# Patient Record
Sex: MALE | Race: WHITE | HISPANIC OR LATINO | Employment: STUDENT | URBAN - METROPOLITAN AREA
[De-identification: names, ages, dates, MRNs, and addresses within clinical notes are randomized per-mention and may not be internally consistent; named-entity substitution may affect disease eponyms.]

---
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
## 2021-01-05 ENCOUNTER — CLINICAL SUPPORT (OUTPATIENT)
Dept: URGENT CARE | Facility: CLINIC | Age: 21
End: 2021-01-05
Payer: MEDICAID

## 2021-01-05 DIAGNOSIS — Z20.822 ENCOUNTER FOR LABORATORY TESTING FOR COVID-19 VIRUS: Primary | ICD-10-CM

## 2021-01-05 LAB
CTP QC/QA: YES
SARS-COV-2 RDRP RESP QL NAA+PROBE: NEGATIVE

## 2021-01-05 PROCEDURE — U0002 COVID-19 LAB TEST NON-CDC: HCPCS | Mod: QW,S$GLB,, | Performed by: FAMILY MEDICINE

## 2021-01-05 PROCEDURE — U0002: ICD-10-PCS | Mod: QW,S$GLB,, | Performed by: FAMILY MEDICINE

## 2021-07-01 ENCOUNTER — PATIENT MESSAGE (OUTPATIENT)
Dept: ADMINISTRATIVE | Facility: OTHER | Age: 21
End: 2021-07-01

## 2021-10-15 ENCOUNTER — OFFICE VISIT (OUTPATIENT)
Dept: URGENT CARE | Facility: CLINIC | Age: 21
End: 2021-10-15
Payer: MEDICAID

## 2021-10-15 VITALS
RESPIRATION RATE: 16 BRPM | TEMPERATURE: 99 F | SYSTOLIC BLOOD PRESSURE: 140 MMHG | WEIGHT: 185 LBS | OXYGEN SATURATION: 98 % | BODY MASS INDEX: 25.06 KG/M2 | DIASTOLIC BLOOD PRESSURE: 91 MMHG | HEIGHT: 72 IN | HEART RATE: 85 BPM

## 2021-10-15 DIAGNOSIS — R03.0 ELEVATED BLOOD PRESSURE READING: ICD-10-CM

## 2021-10-15 DIAGNOSIS — Z11.52 ENCOUNTER FOR SCREENING LABORATORY TESTING FOR COVID-19 VIRUS: Primary | ICD-10-CM

## 2021-10-15 DIAGNOSIS — J06.9 ACUTE URI: ICD-10-CM

## 2021-10-15 LAB
CTP QC/QA: YES
SARS-COV-2 RDRP RESP QL NAA+PROBE: NEGATIVE

## 2021-10-15 PROCEDURE — 99213 PR OFFICE/OUTPT VISIT, EST, LEVL III, 20-29 MIN: ICD-10-PCS | Mod: S$GLB,,, | Performed by: PHYSICIAN ASSISTANT

## 2021-10-15 PROCEDURE — 99213 OFFICE O/P EST LOW 20 MIN: CPT | Mod: S$GLB,,, | Performed by: PHYSICIAN ASSISTANT

## 2021-10-15 PROCEDURE — U0002 COVID-19 LAB TEST NON-CDC: HCPCS | Mod: QW,S$GLB,, | Performed by: PHYSICIAN ASSISTANT

## 2021-10-15 PROCEDURE — U0002: ICD-10-PCS | Mod: QW,S$GLB,, | Performed by: PHYSICIAN ASSISTANT

## 2021-10-18 ENCOUNTER — TELEPHONE (OUTPATIENT)
Dept: URGENT CARE | Facility: CLINIC | Age: 21
End: 2021-10-18

## 2021-11-04 ENCOUNTER — CLINICAL SUPPORT (OUTPATIENT)
Dept: URGENT CARE | Facility: CLINIC | Age: 21
End: 2021-11-04
Payer: MEDICAID

## 2021-11-04 DIAGNOSIS — Z01.812 ENCOUNTER FOR SCREENING LABORATORY TESTING FOR COVID-19 VIRUS IN ASYMPTOMATIC PATIENT: Primary | ICD-10-CM

## 2021-11-04 DIAGNOSIS — Z11.52 ENCOUNTER FOR SCREENING LABORATORY TESTING FOR COVID-19 VIRUS IN ASYMPTOMATIC PATIENT: Primary | ICD-10-CM

## 2021-11-04 LAB
CTP QC/QA: YES
SARS-COV-2 RDRP RESP QL NAA+PROBE: NEGATIVE

## 2021-11-04 PROCEDURE — 99211 PR OFFICE/OUTPT VISIT, EST, LEVL I: ICD-10-PCS | Mod: S$GLB,CS,, | Performed by: NURSE PRACTITIONER

## 2021-11-04 PROCEDURE — U0002 COVID-19 LAB TEST NON-CDC: HCPCS | Mod: QW,S$GLB,, | Performed by: NURSE PRACTITIONER

## 2021-11-04 PROCEDURE — U0002: ICD-10-PCS | Mod: QW,S$GLB,, | Performed by: NURSE PRACTITIONER

## 2021-11-04 PROCEDURE — 99211 OFF/OP EST MAY X REQ PHY/QHP: CPT | Mod: S$GLB,CS,, | Performed by: NURSE PRACTITIONER

## 2021-11-09 ENCOUNTER — CLINICAL SUPPORT (OUTPATIENT)
Dept: URGENT CARE | Facility: CLINIC | Age: 21
End: 2021-11-09
Payer: MEDICAID

## 2021-11-09 DIAGNOSIS — Z11.52 ENCOUNTER FOR SCREENING FOR COVID-19: Primary | ICD-10-CM

## 2021-11-09 LAB
CTP QC/QA: YES
SARS-COV-2 RDRP RESP QL NAA+PROBE: NEGATIVE

## 2021-11-09 PROCEDURE — 99211 PR OFFICE/OUTPT VISIT, EST, LEVL I: ICD-10-PCS | Mod: S$GLB,,, | Performed by: NURSE PRACTITIONER

## 2021-11-09 PROCEDURE — 99211 OFF/OP EST MAY X REQ PHY/QHP: CPT | Mod: S$GLB,,, | Performed by: NURSE PRACTITIONER

## 2021-11-09 PROCEDURE — U0002 COVID-19 LAB TEST NON-CDC: HCPCS | Mod: QW,S$GLB,, | Performed by: NURSE PRACTITIONER

## 2021-11-09 PROCEDURE — U0002: ICD-10-PCS | Mod: QW,S$GLB,, | Performed by: NURSE PRACTITIONER

## 2022-01-19 ENCOUNTER — OFFICE VISIT (OUTPATIENT)
Dept: URGENT CARE | Facility: CLINIC | Age: 22
End: 2022-01-19
Payer: MEDICAID

## 2022-01-19 VITALS
OXYGEN SATURATION: 99 % | WEIGHT: 215 LBS | HEIGHT: 73 IN | HEART RATE: 88 BPM | DIASTOLIC BLOOD PRESSURE: 95 MMHG | RESPIRATION RATE: 18 BRPM | SYSTOLIC BLOOD PRESSURE: 143 MMHG | TEMPERATURE: 97 F | BODY MASS INDEX: 28.49 KG/M2

## 2022-01-19 DIAGNOSIS — J06.9 VIRAL URI: Primary | ICD-10-CM

## 2022-01-19 DIAGNOSIS — R05.9 COUGH: ICD-10-CM

## 2022-01-19 LAB
CTP QC/QA: YES
SARS-COV-2 RDRP RESP QL NAA+PROBE: NEGATIVE

## 2022-01-19 PROCEDURE — 99213 OFFICE O/P EST LOW 20 MIN: CPT | Mod: S$GLB,,, | Performed by: NURSE PRACTITIONER

## 2022-01-19 PROCEDURE — 3008F PR BODY MASS INDEX (BMI) DOCUMENTED: ICD-10-PCS | Mod: CPTII,S$GLB,, | Performed by: NURSE PRACTITIONER

## 2022-01-19 PROCEDURE — 3080F PR MOST RECENT DIASTOLIC BLOOD PRESSURE >= 90 MM HG: ICD-10-PCS | Mod: CPTII,S$GLB,, | Performed by: NURSE PRACTITIONER

## 2022-01-19 PROCEDURE — 1159F PR MEDICATION LIST DOCUMENTED IN MEDICAL RECORD: ICD-10-PCS | Mod: CPTII,S$GLB,, | Performed by: NURSE PRACTITIONER

## 2022-01-19 PROCEDURE — 3080F DIAST BP >= 90 MM HG: CPT | Mod: CPTII,S$GLB,, | Performed by: NURSE PRACTITIONER

## 2022-01-19 PROCEDURE — 99213 PR OFFICE/OUTPT VISIT, EST, LEVL III, 20-29 MIN: ICD-10-PCS | Mod: S$GLB,,, | Performed by: NURSE PRACTITIONER

## 2022-01-19 PROCEDURE — 1159F MED LIST DOCD IN RCRD: CPT | Mod: CPTII,S$GLB,, | Performed by: NURSE PRACTITIONER

## 2022-01-19 PROCEDURE — 3077F SYST BP >= 140 MM HG: CPT | Mod: CPTII,S$GLB,, | Performed by: NURSE PRACTITIONER

## 2022-01-19 PROCEDURE — 3077F PR MOST RECENT SYSTOLIC BLOOD PRESSURE >= 140 MM HG: ICD-10-PCS | Mod: CPTII,S$GLB,, | Performed by: NURSE PRACTITIONER

## 2022-01-19 PROCEDURE — 3008F BODY MASS INDEX DOCD: CPT | Mod: CPTII,S$GLB,, | Performed by: NURSE PRACTITIONER

## 2022-01-19 PROCEDURE — U0002: ICD-10-PCS | Mod: QW,S$GLB,, | Performed by: NURSE PRACTITIONER

## 2022-01-19 PROCEDURE — U0002 COVID-19 LAB TEST NON-CDC: HCPCS | Mod: QW,S$GLB,, | Performed by: NURSE PRACTITIONER

## 2022-01-19 RX ORDER — ALBUTEROL SULFATE 90 UG/1
AEROSOL, METERED RESPIRATORY (INHALATION)
COMMUNITY
Start: 2021-12-06

## 2022-01-19 RX ORDER — PROMETHAZINE HYDROCHLORIDE AND DEXTROMETHORPHAN HYDROBROMIDE 6.25; 15 MG/5ML; MG/5ML
5 SYRUP ORAL
Qty: 118 ML | Refills: 0 | Status: SHIPPED | OUTPATIENT
Start: 2022-01-19 | End: 2022-01-29

## 2022-01-19 RX ORDER — BENZONATATE 100 MG/1
200 CAPSULE ORAL 3 TIMES DAILY PRN
Qty: 30 CAPSULE | Refills: 0 | Status: SHIPPED | OUTPATIENT
Start: 2022-01-19 | End: 2022-01-29

## 2022-01-19 NOTE — PATIENT INSTRUCTIONS
If you were prescribed a narcotic or controlled medication, do not drive or operate heavy equipment or machinery while taking these medications.  You must understand that you've received an Urgent Care treatment only and that you may be released before all your medical problems are known or treated. You, the patient, will arrange for follow up care as instructed.  Follow up with your PCP or specialty clinic as directed within 2-5 days if not improved or as needed.  You can call [phone] to schedule an appointment with the appropriate provider.  If your condition worsens we recommend that you receive another evaluation at the emergency room immediately or contact your primary medical clinics after hours call service to discuss your concerns.  Please return here or go to the Emergency Department for any concerns or worsening of condition.    Patient Education       Viral Upper Respiratory Infection Discharge Instructions, Adult   About this topic   You have an upper respiratory infection or URI. A URI can affect your nose, throat, ears, and sinuses. A virus is the cause of almost all URIs and antibiotics will not help you feel better more quickly. The common cold is an example of a viral URI.  URIs are easy to spread from person to person, most often through coughing or sneezing. A URI will almost always get better in a week or two without any treatment.         What care is needed at home?   · Ask your doctor what you need to do when you go home. Make sure you ask questions if you do not understand what the doctor says.  · If you smoke, try to quit. Your doctor or nurse can help.  · Drink lots of fluids like water, juice, or broth. This will help replace any fluids lost if you have a runny nose or fever. Warm tea or soup can help soothe a sore throat.  · If the air in your home feels dry, use a cool mist humidifier. This can help a stuffy nose and make it easier to breathe.  · You can also use saline nose drops  to relieve stuffiness.  · If you decide to take over-the-counter cough or cold medicines, follow the directions on the label carefully. Be sure you do not take more than 1 medicine that contains acetaminophen. Also, if you have a heart problem or high blood pressure, check with your doctor before you take any of these medicines.  · Wash your hands often. Cough or sneeze into a tissue or your elbow instead of your hands. This will help keep others healthy.  What follow-up care is needed?   Your doctor may ask you to make visits to the office to check on your progress. Be sure to keep these visits.  What drugs may be needed?   The doctor may order drugs to:  · Open up the tubes of your lungs  · Treat viral infection  · Relieve or stop coughing  · Help with pain from a sore throat  · Relieve runny and stuffy nose  · Provide oxygen  Will physical activity be limited?   You need to rest for a few days to let your body recover from the infection.  What changes to diet are needed?   Eat soft foods like soup if swallowing is too painful.  What problems could happen?   · Asthma attack  · Sinus infections  · Lung problems like pneumonia and bronchitis  · Severe fluid loss. This is dehydration.  What can be done to prevent this health problem?   · Wash your hands often with soap and water for at least 20 seconds, especially after coughing or sneezing. Alcohol-based hand sanitizers also work to kill the virus.  · If you are sick, cover your mouth and nose with tissue when you cough or sneeze. You can also cough into your elbow. Throw away tissues in the trash and wash your hands after touching used tissues.  · Do not get too close (kissing, hugging) to people who are sick.  · Do not share towels or hankies with anyone who is sick. Clean commonly handled things like door handles, remotes, toys, and phones. Wipe them with a disinfectant.  · Stay away from crowded places.  · Cover your nose and mouth when you sneeze or  cough.  · Take vitamin C to help build up your body's ability to fight disease.  · Get a flu shot each year.  When do I need to call the doctor?   · You have trouble breathing when talking or sitting still.  · You have a fever of 100.4°F (38°C) or higher for several days, chills, a very bad sore throat, or ear or sinus pain.  · You develop a new fever after several days of feeling the same or improving.  · You develop chest pain when you cough.  · You have a cough that lasts more than 10 days.  · You cough up blood, or the color of the mucus you cough up changes.  Teach Back: Helping You Understand   The Teach Back Method helps you understand the information we are giving you. After you talk with the staff, tell them in your own words what you learned. This helps to make sure the staff has described each thing clearly. It also helps to explain things that may have been confusing. Before going home, make sure you can do these:  · I can tell you about my condition.  · I can tell you what may help ease my signs.  · I can tell you what I will do if I have a fever, chills, breathing very fast, or trouble breathing.  Where can I learn more?   American Lung Association  https://www.lung.org/blog/can-you-exercise-with-a-cold   American Lung Association  https://www.lung.org/lung-health-diseases/lung-disease-lookup/influenza/facts-about-the-common-cold   NHS Choices  https://www.nhs.uk/conditions/respiratory-tract-infection/   UpToDate  https://www.uptodate.com/contents/the-common-cold-in-adults-beyond-the-basics   Last Reviewed Date   2021-06-08  Consumer Information Use and Disclaimer   This information is not specific medical advice and does not replace information you receive from your health care provider. This is only a brief summary of general information. It does NOT include all information about conditions, illnesses, injuries, tests, procedures, treatments, therapies, discharge instructions or life-style choices  that may apply to you. You must talk with your health care provider for complete information about your health and treatment options. This information should not be used to decide whether or not to accept your health care providers advice, instructions or recommendations. Only your health care provider has the knowledge and training to provide advice that is right for you.  Copyright   Copyright © 2021 [employer] acquired by [employer] and its affiliates and/or licensors. All rights reserved.

## 2022-01-19 NOTE — PROGRESS NOTES
"Subjective:       Patient ID: Jerry Dominique is a 21 y.o. male.    Vitals:  height is 6' 1" (1.854 m) and weight is 97.5 kg (215 lb). His tympanic temperature is 97.1 °F (36.2 °C). His blood pressure is 143/95 (abnormal) and his pulse is 88. His respiration is 18 and oxygen saturation is 99%.     Chief Complaint: Cough    21 yr old male presents to the Urgent Care with complaint of coughing that worsen at night x 3 days. Patient has hx of asthma.     Cough  This is a new problem. The current episode started in the past 7 days. The problem has been unchanged. The problem occurs every few minutes. The cough is non-productive. Pertinent negatives include no chest pain, chills, ear congestion, ear pain, fever, headaches, heartburn, hemoptysis, myalgias, nasal congestion, postnasal drip, rash, rhinorrhea, sore throat, shortness of breath, sweats, weight loss or wheezing. The symptoms are aggravated by lying down. He has tried OTC inhaler for the symptoms. The treatment provided no relief. His past medical history is significant for asthma.       Constitution: Negative for chills, sweating, fatigue and fever.   HENT: Negative for ear pain, postnasal drip and sore throat.    Cardiovascular: Negative for chest pain and sob on exertion.   Respiratory: Positive for cough. Negative for sputum production, bloody sputum, shortness of breath and wheezing.    Gastrointestinal: Negative for heartburn.   Musculoskeletal: Negative for muscle ache.   Skin: Negative for rash.   Neurological: Negative for headaches and altered mental status.   Psychiatric/Behavioral: Negative for altered mental status.       Objective:      Physical Exam   Constitutional: He is oriented to person, place, and time. Vital signs are normal. He appears well-developed and well-nourished. He is active and cooperative.  Non-toxic appearance. He does not appear ill. No distress.   HENT:   Head: Normocephalic and atraumatic.   Ears:   Right Ear: Hearing, " tympanic membrane, external ear and ear canal normal.   Left Ear: Hearing, tympanic membrane, external ear and ear canal normal.   Nose: Nose normal. No mucosal edema, rhinorrhea or nasal deformity. No epistaxis. Right sinus exhibits no maxillary sinus tenderness and no frontal sinus tenderness. Left sinus exhibits no maxillary sinus tenderness and no frontal sinus tenderness.   Mouth/Throat: Uvula is midline, oropharynx is clear and moist and mucous membranes are normal. No trismus in the jaw. Normal dentition. No uvula swelling. No oropharyngeal exudate, posterior oropharyngeal edema or posterior oropharyngeal erythema. No tonsillar exudate.   Eyes: Conjunctivae, EOM and lids are normal. Pupils are equal, round, and reactive to light. No scleral icterus.      extraocular movement intact   Neck: Neck supple. No edema present. No erythema present. No neck rigidity present.   Cardiovascular: Normal rate, regular rhythm, normal heart sounds, intact distal pulses and normal pulses.   Pulmonary/Chest: Effort normal and breath sounds normal. No accessory muscle usage or stridor. No respiratory distress. He has no decreased breath sounds. He has no wheezes. He has no rhonchi. He has no rales.   Abdominal: Normal appearance.   Musculoskeletal: Normal range of motion.         General: No deformity or edema. Normal range of motion.   Neurological: He is alert and oriented to person, place, and time. He exhibits normal muscle tone. Coordination and gait normal.   Skin: Skin is warm, dry, intact, not diaphoretic and not pale. Capillary refill takes less than 2 seconds.   Psychiatric: He experiences Normal attention. He has a normal mood and affect. His speech is normal and behavior is normal. Mood, judgment and thought content normal. Cognition and memory  Nursing note and vitals reviewed.        Assessment:       1. Viral URI    2. Cough        Results for orders placed or performed in visit on 01/19/22   POCT COVID-19 Rapid  Screening   Result Value Ref Range    POC Rapid COVID Negative Negative     Acceptable Yes        Plan:         Viral URI    Cough  -     POCT COVID-19 Rapid Screening  -     promethazine-dextromethorphan (PROMETHAZINE-DM) 6.25-15 mg/5 mL Syrp; Take 5 mLs by mouth every 4 to 6 hours as needed (May cause drowsiness take at night for cough).  Dispense: 118 mL; Refill: 0  -     benzonatate (TESSALON PERLES) 100 MG capsule; Take 2 capsules (200 mg total) by mouth 3 (three) times daily as needed for Cough.  Dispense: 30 capsule; Refill: 0      Patient Instructions   If you were prescribed a narcotic or controlled medication, do not drive or operate heavy equipment or machinery while taking these medications.  You must understand that you've received an Urgent Care treatment only and that you may be released before all your medical problems are known or treated. You, the patient, will arrange for follow up care as instructed.  Follow up with your PCP or specialty clinic as directed within 2-5 days if not improved or as needed.  You can call [phone] to schedule an appointment with the appropriate provider.  If your condition worsens we recommend that you receive another evaluation at the emergency room immediately or contact your primary medical clinics after hours call service to discuss your concerns.  Please return here or go to the Emergency Department for any concerns or worsening of condition.    Patient Education       Viral Upper Respiratory Infection Discharge Instructions, Adult   About this topic   You have an upper respiratory infection or URI. A URI can affect your nose, throat, ears, and sinuses. A virus is the cause of almost all URIs and antibiotics will not help you feel better more quickly. The common cold is an example of a viral URI.  URIs are easy to spread from person to person, most often through coughing or sneezing. A URI will almost always get better in a week or two without  any treatment.         What care is needed at home?   · Ask your doctor what you need to do when you go home. Make sure you ask questions if you do not understand what the doctor says.  · If you smoke, try to quit. Your doctor or nurse can help.  · Drink lots of fluids like water, juice, or broth. This will help replace any fluids lost if you have a runny nose or fever. Warm tea or soup can help soothe a sore throat.  · If the air in your home feels dry, use a cool mist humidifier. This can help a stuffy nose and make it easier to breathe.  · You can also use saline nose drops to relieve stuffiness.  · If you decide to take over-the-counter cough or cold medicines, follow the directions on the label carefully. Be sure you do not take more than 1 medicine that contains acetaminophen. Also, if you have a heart problem or high blood pressure, check with your doctor before you take any of these medicines.  · Wash your hands often. Cough or sneeze into a tissue or your elbow instead of your hands. This will help keep others healthy.  What follow-up care is needed?   Your doctor may ask you to make visits to the office to check on your progress. Be sure to keep these visits.  What drugs may be needed?   The doctor may order drugs to:  · Open up the tubes of your lungs  · Treat viral infection  · Relieve or stop coughing  · Help with pain from a sore throat  · Relieve runny and stuffy nose  · Provide oxygen  Will physical activity be limited?   You need to rest for a few days to let your body recover from the infection.  What changes to diet are needed?   Eat soft foods like soup if swallowing is too painful.  What problems could happen?   · Asthma attack  · Sinus infections  · Lung problems like pneumonia and bronchitis  · Severe fluid loss. This is dehydration.  What can be done to prevent this health problem?   · Wash your hands often with soap and water for at least 20 seconds, especially after coughing or sneezing.  Alcohol-based hand sanitizers also work to kill the virus.  · If you are sick, cover your mouth and nose with tissue when you cough or sneeze. You can also cough into your elbow. Throw away tissues in the trash and wash your hands after touching used tissues.  · Do not get too close (kissing, hugging) to people who are sick.  · Do not share towels or hankies with anyone who is sick. Clean commonly handled things like door handles, remotes, toys, and phones. Wipe them with a disinfectant.  · Stay away from crowded places.  · Cover your nose and mouth when you sneeze or cough.  · Take vitamin C to help build up your body's ability to fight disease.  · Get a flu shot each year.  When do I need to call the doctor?   · You have trouble breathing when talking or sitting still.  · You have a fever of 100.4°F (38°C) or higher for several days, chills, a very bad sore throat, or ear or sinus pain.  · You develop a new fever after several days of feeling the same or improving.  · You develop chest pain when you cough.  · You have a cough that lasts more than 10 days.  · You cough up blood, or the color of the mucus you cough up changes.  Teach Back: Helping You Understand   The Teach Back Method helps you understand the information we are giving you. After you talk with the staff, tell them in your own words what you learned. This helps to make sure the staff has described each thing clearly. It also helps to explain things that may have been confusing. Before going home, make sure you can do these:  · I can tell you about my condition.  · I can tell you what may help ease my signs.  · I can tell you what I will do if I have a fever, chills, breathing very fast, or trouble breathing.  Where can I learn more?   American Lung Association  https://www.lung.org/blog/can-you-exercise-with-a-cold   American Lung Association  https://www.lung.org/lung-health-diseases/lung-disease-lookup/influenza/facts-about-the-common-cold   [facility]  Choices  https://www.nhs.uk/conditions/respiratory-tract-infection/   [employer]  https://www.[employer].com/contents/the-common-cold-in-adults-beyond-the-basics   Last Reviewed Date   2021-06-08  Consumer Information Use and Disclaimer   This information is not specific medical advice and does not replace information you receive from your health care provider. This is only a brief summary of general information. It does NOT include all information about conditions, illnesses, injuries, tests, procedures, treatments, therapies, discharge instructions or life-style choices that may apply to you. You must talk with your health care provider for complete information about your health and treatment options. This information should not be used to decide whether or not to accept your health care providers advice, instructions or recommendations. Only your health care provider has the knowledge and training to provide advice that is right for you.  Copyright   Copyright © 2021 [employer] and its affiliates and/or licensors. All rights reserved.

## 2022-01-27 ENCOUNTER — OFFICE VISIT (OUTPATIENT)
Dept: URGENT CARE | Facility: CLINIC | Age: 22
End: 2022-01-27
Payer: MEDICAID

## 2022-01-27 VITALS
RESPIRATION RATE: 14 BRPM | DIASTOLIC BLOOD PRESSURE: 78 MMHG | OXYGEN SATURATION: 97 % | HEIGHT: 73 IN | TEMPERATURE: 97 F | SYSTOLIC BLOOD PRESSURE: 149 MMHG | WEIGHT: 215 LBS | BODY MASS INDEX: 28.49 KG/M2 | HEART RATE: 76 BPM

## 2022-01-27 DIAGNOSIS — J45.21 MILD INTERMITTENT ASTHMA WITH EXACERBATION: Primary | ICD-10-CM

## 2022-01-27 DIAGNOSIS — R03.0 ELEVATED BLOOD PRESSURE READING: ICD-10-CM

## 2022-01-27 LAB
CTP QC/QA: YES
SARS-COV-2 RDRP RESP QL NAA+PROBE: NEGATIVE

## 2022-01-27 PROCEDURE — 3008F PR BODY MASS INDEX (BMI) DOCUMENTED: ICD-10-PCS | Mod: CPTII,S$GLB,, | Performed by: PHYSICIAN ASSISTANT

## 2022-01-27 PROCEDURE — 3008F BODY MASS INDEX DOCD: CPT | Mod: CPTII,S$GLB,, | Performed by: PHYSICIAN ASSISTANT

## 2022-01-27 PROCEDURE — 1159F MED LIST DOCD IN RCRD: CPT | Mod: CPTII,S$GLB,, | Performed by: PHYSICIAN ASSISTANT

## 2022-01-27 PROCEDURE — 3077F PR MOST RECENT SYSTOLIC BLOOD PRESSURE >= 140 MM HG: ICD-10-PCS | Mod: CPTII,S$GLB,, | Performed by: PHYSICIAN ASSISTANT

## 2022-01-27 PROCEDURE — 3078F PR MOST RECENT DIASTOLIC BLOOD PRESSURE < 80 MM HG: ICD-10-PCS | Mod: CPTII,S$GLB,, | Performed by: PHYSICIAN ASSISTANT

## 2022-01-27 PROCEDURE — 3078F DIAST BP <80 MM HG: CPT | Mod: CPTII,S$GLB,, | Performed by: PHYSICIAN ASSISTANT

## 2022-01-27 PROCEDURE — 1159F PR MEDICATION LIST DOCUMENTED IN MEDICAL RECORD: ICD-10-PCS | Mod: CPTII,S$GLB,, | Performed by: PHYSICIAN ASSISTANT

## 2022-01-27 PROCEDURE — 1160F RVW MEDS BY RX/DR IN RCRD: CPT | Mod: CPTII,S$GLB,, | Performed by: PHYSICIAN ASSISTANT

## 2022-01-27 PROCEDURE — U0002 COVID-19 LAB TEST NON-CDC: HCPCS | Mod: QW,S$GLB,, | Performed by: PHYSICIAN ASSISTANT

## 2022-01-27 PROCEDURE — 3077F SYST BP >= 140 MM HG: CPT | Mod: CPTII,S$GLB,, | Performed by: PHYSICIAN ASSISTANT

## 2022-01-27 PROCEDURE — U0002: ICD-10-PCS | Mod: QW,S$GLB,, | Performed by: PHYSICIAN ASSISTANT

## 2022-01-27 PROCEDURE — 99214 OFFICE O/P EST MOD 30 MIN: CPT | Mod: S$GLB,,, | Performed by: PHYSICIAN ASSISTANT

## 2022-01-27 PROCEDURE — 99214 PR OFFICE/OUTPT VISIT, EST, LEVL IV, 30-39 MIN: ICD-10-PCS | Mod: S$GLB,,, | Performed by: PHYSICIAN ASSISTANT

## 2022-01-27 PROCEDURE — 1160F PR REVIEW ALL MEDS BY PRESCRIBER/CLIN PHARMACIST DOCUMENTED: ICD-10-PCS | Mod: CPTII,S$GLB,, | Performed by: PHYSICIAN ASSISTANT

## 2022-01-27 RX ORDER — PREDNISONE 20 MG/1
TABLET ORAL
Qty: 15 TABLET | Refills: 0 | Status: SHIPPED | OUTPATIENT
Start: 2022-01-27 | End: 2022-02-04

## 2022-01-27 NOTE — PATIENT INSTRUCTIONS
"Take mucinex DM for daytime use. The prescribed promethazine DM at night (may cause drowsiness). Alternatively, if your cough becomes more severe you may take promethazine DM cough syrup every 4-6 hours (but do not combine with over the counter cough syrup). Rest and drink plenty of fluids.Tylenol or motrin for fever, sore throat or body aches.     Steroid taper to help with wheezing and congestion. Take until completion. Use your inhaler 2-4 puffs every 4-6 hours as needed for wheezing.     You need re-evaluated for any new persistent fever > 4 days, progressive chest pain or shortness of breath not relieved by inhaler, or cough > 4 weeks.  Patient Education       Asthma in Adults   The Basics   Written by the doctors and editors at [facility]   What is asthma? -- Asthma is a condition that can make it hard to breathe. Asthma symptoms can be mild or severe. And they can come and go. Sometimes asthma symptoms start all of a sudden. Asthma attacks happen when the airways in the lungs become narrow and inflamed (figure 1). Asthma can run in families.  How should I manage my asthma during the COVID-19 pandemic? -- COVID-19 stands for "coronavirus disease 2019." It is caused by a virus called SARS-CoV-2. The virus first appeared in late 2019 and has since spread throughout the world.  People with COVID-19 can have fever, cough, and other symptoms. In severe cases, it can cause pneumonia and trouble breathing. Some people with asthma might be more likely to have serious symptoms if they get COVID-19. If you have asthma, it's especially important to get the COVID-19 vaccine as soon as you are able. This will greatly lower your risk of getting sick.  If you take medicines to control your asthma or treat asthma attacks, it's important to keep taking them as usual. If you have symptoms of COVID-19, or think you might have been exposed to the virus, call your doctor or nurse.  The rest of this article has general information " "about asthma.  What are the symptoms of asthma? -- Asthma symptoms can include:  · Wheezing or noisy breathing  · Coughing  · A tight feeling in the chest  · Shortness of breath  Symptoms can happen each day, each week, or less often. Symptoms can range from mild to severe. Although it is rare, an episode of asthma can sometimes even lead to death.  Is there a test for asthma? -- Yes. Your doctor will ask you about your symptoms and have you do a breathing test to see how your lungs are working.   If your doctor thinks allergies might be making your asthma worse, they might suggest allergy testing. This can include skin tests or blood tests.   How is asthma treated? -- Asthma is treated with different types of medicines. The medicines can be inhalers, liquids, or pills. Your doctor will prescribe medicine based on how often you have symptoms and how serious your symptoms are. Asthma medicines work in 1 of 2 ways:  · Quick-relief medicines stop symptoms quickly - in 5 to 15 minutes. Almost everyone with asthma has a quick-relief inhaler that they carry with them. People use these medicines whenever they have asthma symptoms. Most people need these medicines 1 or 2 times a week - or less often. But when asthma symptoms get worse, more doses might be needed.   Some people can feel shaky after taking these medicines. A few people also need a machine called a "nebulizer" to breathe in their medicine.  · Long-term controller medicines control asthma and prevent future symptoms. People who get asthma symptoms more than 2 times a week need to use a controller medicine 1 or 2 times each day.   Controller medicines tend to take longer to work, sometimes a few weeks. So, it can be hard to tell if the medicine is working. Keep taking the medicine, but talk to your doctor or nurse if you do not feel a medicine working.  It is very important that you take all the medicines the doctor prescribes, exactly how you are supposed to " "take them. You might have to take medicines a few times a day. Your doctor or nurse will show you the right way to use your inhaler.   If your symptoms get much worse all of a sudden, use your quick relief medicine and contact your doctor or nurse. You might need to go to the hospital for treatment.  What is an asthma action plan? -- An asthma action plan is a list of instructions that tell you:  · Which medicines to use each day at home  · Which medicines to take if your symptoms get worse  · When to get help or call for an ambulance (in the  and [place], dial [phone])  If you have frequent or severe asthma symptoms, your doctor might suggest that you have an asthma action plan. If so, you and your doctor will work together to make one. As part of your action plan, you might need to use something called a "peak flow meter." Breathing into this device will show how your lungs are working. Your doctor will show you the right way to use your peak flow meter.  Should I see a doctor or nurse? -- Yes. See your doctor or nurse if you have asthma symptoms. And call your doctor or nurse if you have an asthma attack and the symptoms do not improve or get worse after using a quick-relief medicine.   If asthma symptoms are severe, call for an ambulance (in the [place] and [place], dial [phone]).  If you need asthma medicine every day, you should see your doctor or nurse every 6 months or more often.  Can asthma symptoms be prevented? -- Yes. You can help prevent your asthma symptoms. You can stay away from things that cause your symptoms or make them worse. Doctors call these "triggers." If you know what your triggers are, avoid them as much as possible. Below are some common triggers, but your triggers might be different. Ask your doctor or nurse which are important for you:  · Cigarette smoke - Avoid places where people smoke. If you smoke, get help to quit.  · Exercise - Exercise can be good for people with asthma even if it is a " "trigger. But you might need to take an extra dose of your quick-relief inhaler medicine before you exercise. It might help to warm up before doing intense exercise. If you exercise outside on a very cold day, it can also help to wear a loose scarf or mask over your nose and mouth.  · Dust - Mattress and pillow covers can reduce dust mites.  · Mold - Use a dehumidifier or air conditioner to keep indoor air dry. Remove any mold you see.  · Certain animals - These can include dogs, cats, mice, or cockroaches. If you are allergic to animals or insects, try to figure out ways to avoid them.  · Pollen - Stay indoors when possible during pollen season. Keep your windows and doors closed whenever you can.  · Getting sick with a cold or flu - Make sure to get a flu shot every year. Some people also need to get a vaccine to help prevent pneumonia. If you think you might have been exposed to the flu, tell your doctor or nurse. They might prescribe special medicine (called "antiviral" medicine).  · Stress  Some adults with asthma have worse symptoms if they take aspirin or medicines called NSAIDs. NSAIDs include ibuprofen (sample brand names: Advil, Motrin) and naproxen (sample brand names: Aleve, Naprosyn). Ask your doctor if you need to avoid these medicines.  If you can't avoid certain triggers, talk with your doctor about what you can do. For example, you might need to take an extra dose of your quick-relief inhaler medicine before you exercise or are around pollen or animals you are allergic to.  What if I want to get pregnant? -- If you want to get pregnant, talk to your doctor about how to control your asthma. Keeping your asthma well-controlled is important for the health of your baby. Most asthma medicines are safe to take if you are pregnant.  All topics are updated as new evidence becomes available and our peer review process is complete.  This topic retrieved from [employer] on: Sep 21, 2021.  Topic 69698 Version " 15.0  Release: 29.4.2 - C29.263  © 2021 UpToDate, Inc. and/or its affiliates. All rights reserved.  figure 1: Asthma     During an asthma attack or flare-up, the muscles around the airways tighten (constrict), and the lining of the airways gets inflamed. Then, mucus builds up. All of this makes it hard to breathe.  Graphic 78442 Version 9.0    Consumer Information Use and Disclaimer   This information is not specific medical advice and does not replace information you receive from your health care provider. This is only a brief summary of general information. It does NOT include all information about conditions, illnesses, injuries, tests, procedures, treatments, therapies, discharge instructions or life-style choices that may apply to you. You must talk with your health care provider for complete information about your health and treatment options. This information should not be used to decide whether or not to accept your health care provider's advice, instructions or recommendations. Only your health care provider has the knowledge and training to provide advice that is right for you. The use of this information is governed by the [employer] End User License Agreement, available at [email or web address].The use of [employer] content is governed by the [employer] Terms of Use. ©2021 UpToDate, Inc. All rights reserved.  Copyright   © 2021 UpToDate, Inc. and/or its affiliates. All rights reserved.  Patient Education       How to Use a Metered Dose Inhaler ED   General Information   You came to the Emergency Department (ED) for breathing problems. The doctors have ordered a metered dose inhaler, or MDI, to help you feel better. It is important to use your MDI as you were told to.  What care is needed at home?   · Call your regular doctor to let them know you were in the ED. Make a follow-up appointment if you were told to.  · Each inhaler may be a little different. Be sure to follow  the instructions from your doctor on how often you should be using your inhaler. Be sure you ask questions if you do not understand how to use yours.  · The first time you use your inhaler, or if it has been more than 2 weeks since you used your inhaler:  ? Take the cap off of the mouthpiece.  ? Shake the inhaler for 5 seconds.  ? Press down on the canister to spray the medicine into the air away from your face.  ? Repeat these steps 3 times and your inhaler is ready to use.  ? Put the cap back on the mouthpiece.  · To use your inhaler:  ? Take the cap off of the mouthpiece.  ? Shake the inhaler for 5 seconds.  ? Hold the inhaler with your finger on top of the cannister and your thumb holding the bottom of the inhaler.  ? Keep your chin up and the inhaler upright.  ? Breathe out a normal breath.  ? Put the mouthpiece in your mouth, past your teeth, and above the tongue. Close your lips around the mouthpiece or hold the mouthpiece 1 to 2 inches (2 to 4 cm) in front of your mouth.  ? As you start to inhale, press down on the canister.  ? Keep inhaling a deep and slow breath through your mouth.  ? Hold your breath for 5 to 10 seconds after each dose. This helps to keep the medicine in your lungs as long as possible. Then breathe normally.  ? If you need to take 2 puffs of your inhaler, wait 15-30 seconds before you take the next puff. Shake the inhaler again before the next puff.  ? Put the cap back on the mouthpiece when you are finished.  · If you are using a steroid inhaler, rinse your mouth with water, gargle, and spit out the water.  · To clean your inhaler, remove the cannister and cap. Run warm water through the mouthpiece for 30 to 60 seconds. Shake off extra water and allow to air dry. Do not get water on the metal cannister.  · Keep track of how many doses are in your inhaler. There may be a counter on the side of your inhaler or you may have to write this down.  When do I need to get emergency help?   · Call  for an ambulance right away if:   ? You are having so much trouble breathing that you can only say one or two words at a time.  ? You need to sit upright at all times to be able to breathe or cannot lie down.  ? You are very tired from working to catch your breath or you are sweating from trying to breathe.  · Return to the ED if:   ? Your breathing is not getting better even though you have used your inhaler a few times.  ? You have trouble breathing when talking or sitting still.  When do I need to call the doctor?   · You are not able to do your normal activities because of your breathing.  · Your cough gets worse or you start to cough up yellow or green mucus.  · You start to run low on your asthma medicines.  · You have new or worsening symptoms.  Last Reviewed Date   2021-04-13  Consumer Information Use and Disclaimer   This information is not specific medical advice and does not replace information you receive from your health care provider. This is only a brief summary of general information. It does NOT include all information about conditions, illnesses, injuries, tests, procedures, treatments, therapies, discharge instructions or life-style choices that may apply to you. You must talk with your health care provider for complete information about your health and treatment options. This information should not be used to decide whether or not to accept your health care providers advice, instructions or recommendations. Only your health care provider has the knowledge and training to provide advice that is right for you.  Copyright   Copyright © 2021 [employer], [employer]. and its affiliates and/or licensors. All rights reserved.

## 2022-01-27 NOTE — PROGRESS NOTES
"Subjective:       Patient ID: Jerry Dominique is a 21 y.o. male.    Vitals:  height is 6' 1" (1.854 m) and weight is 97.5 kg (215 lb). His tympanic temperature is 97.3 °F (36.3 °C). His blood pressure is 149/78 (abnormal) and his pulse is 76. His respiration is 14 and oxygen saturation is 97%.     Chief Complaint: Sinus Problem    Patient presents with persistent productive cough that is worse at night and in the morning. Not improving with medication.  Here on 1/19/22 with negative covid test. Dx with viral URI. States symptoms worsening, he is wheezing and having to use his inhaler a lot. Hx of asthma. Perles don't really help and can't take Promethazine at school because of drowsiness.        Sinus Problem  This is a new problem. The current episode started 1 to 4 weeks ago (11 days). The problem has been waxing and waning since onset. There has been no fever. His pain is at a severity of 0/10. He is experiencing no pain. Associated symptoms include congestion, coughing, ear pain, neck pain, shortness of breath, sinus pressure, sneezing and a sore throat. Pertinent negatives include no chills, diaphoresis, headaches, hoarse voice or swollen glands. (Wheezing ) Treatments tried: cough meds. The treatment provided mild relief.       Constitution: Negative for chills and sweating.   HENT: Positive for ear pain, congestion, sinus pressure and sore throat.    Neck: Positive for neck pain. Negative for neck stiffness.   Eyes: Negative for eye discharge, eye itching and eye pain.   Respiratory: Positive for cough, sputum production, shortness of breath and wheezing.    Gastrointestinal: Negative for abdominal pain, nausea, vomiting and diarrhea.   Genitourinary: Negative for dysuria.   Musculoskeletal: Negative for muscle ache.   Skin: Negative for rash and wound.   Allergic/Immunologic: Positive for sneezing.   Neurological: Negative for headaches.       Objective:      Physical Exam   Constitutional: He is oriented " to person, place, and time. He appears well-developed and well-nourished. He is cooperative.  Non-toxic appearance. He does not have a sickly appearance. He does not appear ill. No distress.   HENT:   Head: Normocephalic and atraumatic.   Ears:   Right Ear: Hearing, tympanic membrane, external ear and ear canal normal.   Left Ear: Hearing, tympanic membrane, external ear and ear canal normal.   Nose: Congestion present. No mucosal edema, rhinorrhea or nasal deformity. No epistaxis. Right sinus exhibits no maxillary sinus tenderness and no frontal sinus tenderness. Left sinus exhibits no maxillary sinus tenderness and no frontal sinus tenderness.   Mouth/Throat: Uvula is midline, oropharynx is clear and moist and mucous membranes are normal. No trismus in the jaw. Normal dentition. No uvula swelling. No oropharyngeal exudate, posterior oropharyngeal edema or posterior oropharyngeal erythema.   Eyes: Conjunctivae and lids are normal. No scleral icterus.   Neck: Trachea normal and phonation normal. Neck supple. No edema present. No erythema present. No neck rigidity present.   Cardiovascular: Normal rate, regular rhythm, normal heart sounds, intact distal pulses and normal pulses.   Pulmonary/Chest: Effort normal. No stridor. No respiratory distress. He has no decreased breath sounds. He has wheezes (mild end exp wheeze throughout). He has no rhonchi. He has no rales.   Abdominal: Normal appearance. There is no abdominal tenderness. There is no guarding.   Musculoskeletal: Normal range of motion.         General: No deformity or edema. Normal range of motion.   Lymphadenopathy:     He has no cervical adenopathy.   Neurological: He is alert and oriented to person, place, and time. He exhibits normal muscle tone. Coordination normal.   Skin: Skin is warm, dry, intact, not diaphoretic and not pale.   Psychiatric: He has a normal mood and affect. His speech is normal and behavior is normal. Judgment and thought content  normal. Cognition and memory  Nursing note and vitals reviewed.        Assessment:       1. Mild intermittent asthma with exacerbation    2. Elevated blood pressure reading          Plan:       tx asthma exacerbation with po steroids and his inhaler prn. Recommend mucinex dm daytime use and his rx promethazine DM at night.     Elevated blood pressure noted. Chart review indicates his BP has been 140-150/80-90 on 3 other visits since last fall. Recommend BP diary x2-3 weeks, avoid decongestants, low salt diet, smoking and ETOH cessation. Report findings to PCP if persistently > 120/80.    Mild intermittent asthma with exacerbation  -     POCT COVID-19 Rapid Screening    Elevated blood pressure reading    Other orders  -     predniSONE (DELTASONE) 20 MG tablet; Take 1 tablet (20 mg total) by mouth 3 (three) times daily for 2 days, THEN 1 tablet (20 mg total) 2 (two) times daily for 3 days, THEN 1 tablet (20 mg total) once daily for 3 days.  Dispense: 15 tablet; Refill: 0    Caroline Fusilier PA-C [facility]         Patient Instructions   Take mucinex DM for daytime use. The prescribed promethazine DM at night (may cause drowsiness). Alternatively, if your cough becomes more severe you may take promethazine DM cough syrup every 4-6 hours (but do not combine with over the counter cough syrup). Rest and drink plenty of fluids.Tylenol or motrin for fever, sore throat or body aches.     Steroid taper to help with wheezing and congestion. Take until completion. Use your inhaler 2-4 puffs every 4-6 hours as needed for wheezing.     You need re-evaluated for any new persistent fever > 4 days, progressive chest pain or shortness of breath not relieved by inhaler, or cough > 4 weeks.  Patient Education       Asthma in Adults   The Basics   Written by the doctors and editors at [facility]   What is asthma? -- Asthma is a condition that can make it hard to breathe. Asthma symptoms can be mild or severe. And they  "can come and go. Sometimes asthma symptoms start all of a sudden. Asthma attacks happen when the airways in the lungs become narrow and inflamed (figure 1). Asthma can run in families.  How should I manage my asthma during the COVID-19 pandemic? -- COVID-19 stands for "coronavirus disease 2019." It is caused by a virus called SARS-CoV-2. The virus first appeared in late 2019 and has since spread throughout the world.  People with COVID-19 can have fever, cough, and other symptoms. In severe cases, it can cause pneumonia and trouble breathing. Some people with asthma might be more likely to have serious symptoms if they get COVID-19. If you have asthma, it's especially important to get the COVID-19 vaccine as soon as you are able. This will greatly lower your risk of getting sick.  If you take medicines to control your asthma or treat asthma attacks, it's important to keep taking them as usual. If you have symptoms of COVID-19, or think you might have been exposed to the virus, call your doctor or nurse.  The rest of this article has general information about asthma.  What are the symptoms of asthma? -- Asthma symptoms can include:  · Wheezing or noisy breathing  · Coughing  · A tight feeling in the chest  · Shortness of breath  Symptoms can happen each day, each week, or less often. Symptoms can range from mild to severe. Although it is rare, an episode of asthma can sometimes even lead to death.  Is there a test for asthma? -- Yes. Your doctor will ask you about your symptoms and have you do a breathing test to see how your lungs are working.   If your doctor thinks allergies might be making your asthma worse, they might suggest allergy testing. This can include skin tests or blood tests.   How is asthma treated? -- Asthma is treated with different types of medicines. The medicines can be inhalers, liquids, or pills. Your doctor will prescribe medicine based on how often you have symptoms and how serious your " "symptoms are. Asthma medicines work in 1 of 2 ways:  · Quick-relief medicines stop symptoms quickly - in 5 to 15 minutes. Almost everyone with asthma has a quick-relief inhaler that they carry with them. People use these medicines whenever they have asthma symptoms. Most people need these medicines 1 or 2 times a week - or less often. But when asthma symptoms get worse, more doses might be needed.   Some people can feel shaky after taking these medicines. A few people also need a machine called a "nebulizer" to breathe in their medicine.  · Long-term controller medicines control asthma and prevent future symptoms. People who get asthma symptoms more than 2 times a week need to use a controller medicine 1 or 2 times each day.   Controller medicines tend to take longer to work, sometimes a few weeks. So, it can be hard to tell if the medicine is working. Keep taking the medicine, but talk to your doctor or nurse if you do not feel a medicine working.  It is very important that you take all the medicines the doctor prescribes, exactly how you are supposed to take them. You might have to take medicines a few times a day. Your doctor or nurse will show you the right way to use your inhaler.   If your symptoms get much worse all of a sudden, use your quick relief medicine and contact your doctor or nurse. You might need to go to the hospital for treatment.  What is an asthma action plan? -- An asthma action plan is a list of instructions that tell you:  · Which medicines to use each day at home  · Which medicines to take if your symptoms get worse  · When to get help or call for an ambulance (in the [place] and [place], dial [phone])  If you have frequent or severe asthma symptoms, your doctor might suggest that you have an asthma action plan. If so, you and your doctor will work together to make one. As part of your action plan, you might need to use something called a "peak flow meter." Breathing into this device will show how " "your lungs are working. Your doctor will show you the right way to use your peak flow meter.  Should I see a doctor or nurse? -- Yes. See your doctor or nurse if you have asthma symptoms. And call your doctor or nurse if you have an asthma attack and the symptoms do not improve or get worse after using a quick-relief medicine.   If asthma symptoms are severe, call for an ambulance (in the [place] and [place], dial [phone]).  If you need asthma medicine every day, you should see your doctor or nurse every 6 months or more often.  Can asthma symptoms be prevented? -- Yes. You can help prevent your asthma symptoms. You can stay away from things that cause your symptoms or make them worse. Doctors call these "triggers." If you know what your triggers are, avoid them as much as possible. Below are some common triggers, but your triggers might be different. Ask your doctor or nurse which are important for you:  · Cigarette smoke - Avoid places where people smoke. If you smoke, get help to quit.  · Exercise - Exercise can be good for people with asthma even if it is a trigger. But you might need to take an extra dose of your quick-relief inhaler medicine before you exercise. It might help to warm up before doing intense exercise. If you exercise outside on a very cold day, it can also help to wear a loose scarf or mask over your nose and mouth.  · Dust - Mattress and pillow covers can reduce dust mites.  · Mold - Use a dehumidifier or air conditioner to keep indoor air dry. Remove any mold you see.  · Certain animals - These can include dogs, cats, mice, or cockroaches. If you are allergic to animals or insects, try to figure out ways to avoid them.  · Pollen - Stay indoors when possible during pollen season. Keep your windows and doors closed whenever you can.  · Getting sick with a cold or flu - Make sure to get a flu shot every year. Some people also need to get a vaccine to help prevent pneumonia. If you think you might have " "been exposed to the flu, tell your doctor or nurse. They might prescribe special medicine (called "antiviral" medicine).  · Stress  Some adults with asthma have worse symptoms if they take aspirin or medicines called NSAIDs. NSAIDs include ibuprofen (sample brand names: Advil, Motrin) and naproxen (sample brand names: Aleve, Naprosyn). Ask your doctor if you need to avoid these medicines.  If you can't avoid certain triggers, talk with your doctor about what you can do. For example, you might need to take an extra dose of your quick-relief inhaler medicine before you exercise or are around pollen or animals you are allergic to.  What if I want to get pregnant? -- If you want to get pregnant, talk to your doctor about how to control your asthma. Keeping your asthma well-controlled is important for the health of your baby. Most asthma medicines are safe to take if you are pregnant.  All topics are updated as new evidence becomes available and our peer review process is complete.  This topic retrieved from [employer] on: Sep 21, 2021.  Topic 22590 Version 15.0  Release: 29.4.2 - C29.263  © 2021 UpToDate, Inc. and/or its affiliates. All rights reserved.  figure 1: Asthma     During an asthma attack or flare-up, the muscles around the airways tighten (constrict), and the lining of the airways gets inflamed. Then, mucus builds up. All of this makes it hard to breathe.  Graphic 66131 Version 9.0    Consumer Information Use and Disclaimer   This information is not specific medical advice and does not replace information you receive from your health care provider. This is only a brief summary of general information. It does NOT include all information about conditions, illnesses, injuries, tests, procedures, treatments, therapies, discharge instructions or life-style choices that may apply to you. You must talk with your health care provider for complete information about your health and treatment options. This information " should not be used to decide whether or not to accept your health care provider's advice, instructions or recommendations. Only your health care provider has the knowledge and training to provide advice that is right for you. The use of this information is governed by the [employer] End User License Agreement, available at https://www.[employer]/en/solutions/[employer]/about/ramila.The use of [employer] content is governed by the [employer] Terms of Use. ©2021 UpToDate, Inc. All rights reserved.  Copyright   © 2021 UpToDate, Inc. and/or its affiliates. All rights reserved.  Patient Education       How to Use a Metered Dose Inhaler ED   General Information   You came to the Emergency Department (ED) for breathing problems. The doctors have ordered a metered dose inhaler, or MDI, to help you feel better. It is important to use your MDI as you were told to.  What care is needed at home?   · Call your regular doctor to let them know you were in the ED. Make a follow-up appointment if you were told to.  · Each inhaler may be a little different. Be sure to follow the instructions from your doctor on how often you should be using your inhaler. Be sure you ask questions if you do not understand how to use yours.  · The first time you use your inhaler, or if it has been more than 2 weeks since you used your inhaler:  ? Take the cap off of the mouthpiece.  ? Shake the inhaler for 5 seconds.  ? Press down on the canister to spray the medicine into the air away from your face.  ? Repeat these steps 3 times and your inhaler is ready to use.  ? Put the cap back on the mouthpiece.  · To use your inhaler:  ? Take the cap off of the mouthpiece.  ? Shake the inhaler for 5 seconds.  ? Hold the inhaler with your finger on top of the cannister and your thumb holding the bottom of the inhaler.  ? Keep your chin up and the inhaler upright.  ? Breathe out a normal breath.  ? Put the mouthpiece in your mouth, past your teeth, and above the  tongue. Close your lips around the mouthpiece or hold the mouthpiece 1 to 2 inches (2 to 4 cm) in front of your mouth.  ? As you start to inhale, press down on the canister.  ? Keep inhaling a deep and slow breath through your mouth.  ? Hold your breath for 5 to 10 seconds after each dose. This helps to keep the medicine in your lungs as long as possible. Then breathe normally.  ? If you need to take 2 puffs of your inhaler, wait 15-30 seconds before you take the next puff. Shake the inhaler again before the next puff.  ? Put the cap back on the mouthpiece when you are finished.  · If you are using a steroid inhaler, rinse your mouth with water, gargle, and spit out the water.  · To clean your inhaler, remove the cannister and cap. Run warm water through the mouthpiece for 30 to 60 seconds. Shake off extra water and allow to air dry. Do not get water on the metal cannister.  · Keep track of how many doses are in your inhaler. There may be a counter on the side of your inhaler or you may have to write this down.  When do I need to get emergency help?   · Call for an ambulance right away if:   ? You are having so much trouble breathing that you can only say one or two words at a time.  ? You need to sit upright at all times to be able to breathe or cannot lie down.  ? You are very tired from working to catch your breath or you are sweating from trying to breathe.  · Return to the ED if:   ? Your breathing is not getting better even though you have used your inhaler a few times.  ? You have trouble breathing when talking or sitting still.  When do I need to call the doctor?   · You are not able to do your normal activities because of your breathing.  · Your cough gets worse or you start to cough up yellow or green mucus.  · You start to run low on your asthma medicines.  · You have new or worsening symptoms.  Last Reviewed Date   2021-04-13  Consumer Information Use and Disclaimer   This information is not specific  medical advice and does not replace information you receive from your health care provider. This is only a brief summary of general information. It does NOT include all information about conditions, illnesses, injuries, tests, procedures, treatments, therapies, discharge instructions or life-style choices that may apply to you. You must talk with your health care provider for complete information about your health and treatment options. This information should not be used to decide whether or not to accept your health care providers advice, instructions or recommendations. Only your health care provider has the knowledge and training to provide advice that is right for you.  Copyright   Copyright © 2021 UpToDate, Inc. and its affiliates and/or licensors. All rights reserved.

## 2022-07-25 ENCOUNTER — OFFICE VISIT (OUTPATIENT)
Dept: URGENT CARE | Facility: CLINIC | Age: 22
End: 2022-07-25
Payer: MEDICAID

## 2022-07-25 VITALS
HEART RATE: 80 BPM | TEMPERATURE: 99 F | RESPIRATION RATE: 20 BRPM | DIASTOLIC BLOOD PRESSURE: 88 MMHG | HEIGHT: 73 IN | SYSTOLIC BLOOD PRESSURE: 140 MMHG | OXYGEN SATURATION: 97 % | BODY MASS INDEX: 28.49 KG/M2 | WEIGHT: 215 LBS

## 2022-07-25 DIAGNOSIS — R05.9 COUGH: ICD-10-CM

## 2022-07-25 DIAGNOSIS — U07.1 COVID-19: Primary | ICD-10-CM

## 2022-07-25 LAB
CTP QC/QA: YES
SARS-COV-2 RDRP RESP QL NAA+PROBE: POSITIVE

## 2022-07-25 PROCEDURE — 1159F PR MEDICATION LIST DOCUMENTED IN MEDICAL RECORD: ICD-10-PCS | Mod: CPTII,S$GLB,, | Performed by: PHYSICIAN ASSISTANT

## 2022-07-25 PROCEDURE — 3079F PR MOST RECENT DIASTOLIC BLOOD PRESSURE 80-89 MM HG: ICD-10-PCS | Mod: CPTII,S$GLB,, | Performed by: PHYSICIAN ASSISTANT

## 2022-07-25 PROCEDURE — 99213 PR OFFICE/OUTPT VISIT, EST, LEVL III, 20-29 MIN: ICD-10-PCS | Mod: S$GLB,,, | Performed by: PHYSICIAN ASSISTANT

## 2022-07-25 PROCEDURE — 3008F BODY MASS INDEX DOCD: CPT | Mod: CPTII,S$GLB,, | Performed by: PHYSICIAN ASSISTANT

## 2022-07-25 PROCEDURE — 1159F MED LIST DOCD IN RCRD: CPT | Mod: CPTII,S$GLB,, | Performed by: PHYSICIAN ASSISTANT

## 2022-07-25 PROCEDURE — 3079F DIAST BP 80-89 MM HG: CPT | Mod: CPTII,S$GLB,, | Performed by: PHYSICIAN ASSISTANT

## 2022-07-25 PROCEDURE — 3077F SYST BP >= 140 MM HG: CPT | Mod: CPTII,S$GLB,, | Performed by: PHYSICIAN ASSISTANT

## 2022-07-25 PROCEDURE — 1160F RVW MEDS BY RX/DR IN RCRD: CPT | Mod: CPTII,S$GLB,, | Performed by: PHYSICIAN ASSISTANT

## 2022-07-25 PROCEDURE — 3077F PR MOST RECENT SYSTOLIC BLOOD PRESSURE >= 140 MM HG: ICD-10-PCS | Mod: CPTII,S$GLB,, | Performed by: PHYSICIAN ASSISTANT

## 2022-07-25 PROCEDURE — 3008F PR BODY MASS INDEX (BMI) DOCUMENTED: ICD-10-PCS | Mod: CPTII,S$GLB,, | Performed by: PHYSICIAN ASSISTANT

## 2022-07-25 PROCEDURE — U0002 COVID-19 LAB TEST NON-CDC: HCPCS | Mod: QW,S$GLB,, | Performed by: PHYSICIAN ASSISTANT

## 2022-07-25 PROCEDURE — 1160F PR REVIEW ALL MEDS BY PRESCRIBER/CLIN PHARMACIST DOCUMENTED: ICD-10-PCS | Mod: CPTII,S$GLB,, | Performed by: PHYSICIAN ASSISTANT

## 2022-07-25 PROCEDURE — U0002: ICD-10-PCS | Mod: QW,S$GLB,, | Performed by: PHYSICIAN ASSISTANT

## 2022-07-25 PROCEDURE — 99213 OFFICE O/P EST LOW 20 MIN: CPT | Mod: S$GLB,,, | Performed by: PHYSICIAN ASSISTANT

## 2022-07-25 NOTE — PATIENT INSTRUCTIONS
Your test was POSITIVE for COVID-19 (coronavirus).       Please isolate yourself at home.  You may leave home and/or return to work once the following conditions are met:    If you were not hospitalized and are not moderately to severely immunocompromised:   More than 5 days since symptoms first appeared AND  More than 24 hours fever free without medications AND  Symptoms are improving  Continue to wear a mask around others for 5 additional days.    If you were hospitalized OR are moderately to severely immunocompromised:  More than 20 days since symptoms first appeared  More than 24 hours fever free without medications  Symptoms have improved    If you had no symptoms but tested positive:  More than 5 days since the date of the first positive test (20 days if moderately to severely immunocompromised). If you develop symptoms, then use the guidelines above.  Continue to wear a mask around others for 5 additional days.      Contact Tracing    As one of the next steps, you will receive a call or text from the [place] Department of Health ([facility]) COVID-19 Tracing Team. See the contact information below so you know not to ignore the health departments call. It is important that you contact them back immediately so they can help.      Contact Tracer Number:  [phone]  Caller ID for most carriers: [facility]     What is contact tracing?  Contact tracing is a process that helps identify everyone who has been in close contact with an infected person. Contact tracers let those people know they may have been exposed and guide them on next steps. Confidentiality is important for everyone; no one will be told who may have exposed them to the virus.  Your involvement is important. The more we know about where and how this virus is spreading, the better chance we have at stopping it from spreading further.  What does exposure mean?  Exposure means you have been within 6 feet for more than 15 minutes with a person who  has or had COVID-19.  What kind of questions do the contact tracers ask?  A contact tracer will confirm your basic contact information including name, address, phone number, and next of kin, as well as asking about any symptoms you may have had. Theyll also ask you how you think you may have gotten sick, such as places where you may have been exposed to the virus, and people you were with. Those names will never be shared with anyone outside of that call, and will only be used to help trace and stop the spread of the virus.   I have privacy concerns. How will the state use my information?  Your privacy about your health is important. All calls are completed using call centers that use the appropriate health privacy protection measures (HIPAA compliance), meaning that your patient information is safe. No one will ever ask you any questions related to immigration status. Your health comes first.   Do I have to participate?  You do not have to participate, but we strongly encourage you to. Contact tracing can help us catch and control new outbreaks as theyre developing to keep your friends and family safe.   What if I dont hear from anyone?  If you dont receive a call within 24 hours, you can call the number above right away to inquire about your status. That line is open from 8:00 am - 8:00 p.m., 7 days a week.  Contact tracing saves lives! Together, we have the power to beat this virus and keep our loved ones and neighbors safe.    For more information see CDC link below.      https://www.cdc.gov/coronavirus/2019-ncov/hcp/guidance-prevent-spread.html#precautions        Sources:  [facility], [place] Department of [facility] and \[facility] [place] [facility]\""           Sincerely,     Hermelinda Junior PA-C

## 2022-07-25 NOTE — PROGRESS NOTES
"Subjective:       Patient ID: Jerry Dominique is a 21 y.o. male.    Vitals:  height is 6' 1" (1.854 m) and weight is 97.5 kg (215 lb). His temperature is 98.5 °F (36.9 °C). His blood pressure is 140/88 (abnormal) and his pulse is 80. His respiration is 20 and oxygen saturation is 97%.     Chief Complaint: Cough    Pt present for cough and runny nose that started 3 days ago. Pt states that he is congested and has body aches. Pt states that he has been exposed to covid. Pt tested negative for covid 2 days ago. Per epic, pt [facility]     Cough  This is a new problem. The current episode started in the past 7 days. The problem has been gradually worsening. The problem occurs constantly. The cough is productive of sputum. Associated symptoms include chills, myalgias, nasal congestion, postnasal drip, rhinorrhea, a sore throat and sweats. Pertinent negatives include no chest pain, ear congestion, ear pain, fever, headaches, heartburn, hemoptysis, rash, shortness of breath, weight loss or wheezing. Nothing aggravates the symptoms. Treatments tried: theraflu. The treatment provided no relief. His past medical history is significant for asthma.       Constitution: Positive for chills. Negative for fever.   HENT: Positive for postnasal drip and sore throat. Negative for ear pain.    Cardiovascular: Negative for chest pain.   Respiratory: Positive for cough. Negative for bloody sputum, shortness of breath and wheezing.    Gastrointestinal: Negative for heartburn.   Musculoskeletal: Positive for muscle ache.   Skin: Negative for rash.   Neurological: Negative for headaches.       Objective:      Physical Exam   Constitutional: He is oriented to person, place, and time. He appears well-developed.   HENT:   Head: Normocephalic and atraumatic.   Ears:   Right Ear: External ear normal.   Left Ear: External ear normal.   Nose: Rhinorrhea and congestion present.   Mouth/Throat: Mucous membranes are normal. "   Eyes: Conjunctivae and lids are normal.   Neck: Trachea normal. Neck supple.   Cardiovascular: Normal rate, regular rhythm and normal heart sounds.   Pulmonary/Chest: Effort normal and breath sounds normal. No respiratory distress.   Abdominal: Normal appearance. He exhibits no abdominal bruit and no pulsatile midline mass.   Musculoskeletal: Normal range of motion.         General: Normal range of motion.   Neurological: He is alert and oriented to person, place, and time. He has normal strength.   Skin: Skin is warm, dry, intact, not diaphoretic and not pale.   Psychiatric: His speech is normal and behavior is normal. Judgment and thought content normal.   Nursing note and vitals reviewed.        Assessment:       1. COVID-19    2. Cough          Here with 3 days of fatigue, congestion and headaches. He denies shortness of breath or chest pain. He was exposed to covid last week. He was tested on 7/23 and it was negative. He was tested today for covid and it was positive. He was instructed to quarantine for the 5 days after first full days of symptoms. He denies vomiting, fever or chills. I recommended he take mucinex for congestion and increase hydration. He was told to return if new or worsening symptoms occur.     Plan:         COVID-19    Cough  -     POCT COVID-19 Rapid Screening

## 2022-07-28 ENCOUNTER — TELEPHONE (OUTPATIENT)
Dept: URGENT CARE | Facility: CLINIC | Age: 22
End: 2022-07-28
Payer: MEDICAID

## 2022-09-22 ENCOUNTER — OFFICE VISIT (OUTPATIENT)
Dept: URGENT CARE | Facility: CLINIC | Age: 22
End: 2022-09-22
Payer: MEDICAID

## 2022-09-22 VITALS
HEIGHT: 73 IN | WEIGHT: 215 LBS | TEMPERATURE: 97 F | HEART RATE: 70 BPM | RESPIRATION RATE: 18 BRPM | SYSTOLIC BLOOD PRESSURE: 127 MMHG | BODY MASS INDEX: 28.49 KG/M2 | OXYGEN SATURATION: 98 % | DIASTOLIC BLOOD PRESSURE: 84 MMHG

## 2022-09-22 DIAGNOSIS — Z20.2 EXPOSURE TO STD: Primary | ICD-10-CM

## 2022-09-22 LAB
BILIRUB UR QL STRIP: NEGATIVE
COLOR UR: YELLOW
GLUCOSE UR QL STRIP: NEGATIVE
KETONES UR QL STRIP: NEGATIVE
LEUKOCYTE ESTERASE UR QL STRIP: NEGATIVE
PH, POC UA: 6
POC BLOOD, URINE: NEGATIVE
POC NITRATES, URINE: NEGATIVE
PROT UR QL STRIP: NEGATIVE
SP GR UR STRIP: 1.01 (ref 1–1.03)
UROBILINOGEN UR STRIP-ACNC: NORMAL (ref 0.3–2.2)

## 2022-09-22 PROCEDURE — 3079F DIAST BP 80-89 MM HG: CPT | Mod: CPTII,S$GLB,, | Performed by: NURSE PRACTITIONER

## 2022-09-22 PROCEDURE — 1159F MED LIST DOCD IN RCRD: CPT | Mod: CPTII,S$GLB,, | Performed by: NURSE PRACTITIONER

## 2022-09-22 PROCEDURE — 1160F PR REVIEW ALL MEDS BY PRESCRIBER/CLIN PHARMACIST DOCUMENTED: ICD-10-PCS | Mod: CPTII,S$GLB,, | Performed by: NURSE PRACTITIONER

## 2022-09-22 PROCEDURE — 1160F RVW MEDS BY RX/DR IN RCRD: CPT | Mod: CPTII,S$GLB,, | Performed by: NURSE PRACTITIONER

## 2022-09-22 PROCEDURE — 99214 OFFICE O/P EST MOD 30 MIN: CPT | Mod: S$GLB,,, | Performed by: NURSE PRACTITIONER

## 2022-09-22 PROCEDURE — 80074 ACUTE HEPATITIS PANEL: CPT | Performed by: NURSE PRACTITIONER

## 2022-09-22 PROCEDURE — 3008F BODY MASS INDEX DOCD: CPT | Mod: CPTII,S$GLB,, | Performed by: NURSE PRACTITIONER

## 2022-09-22 PROCEDURE — 87491 CHLMYD TRACH DNA AMP PROBE: CPT | Performed by: NURSE PRACTITIONER

## 2022-09-22 PROCEDURE — 36415 COLL VENOUS BLD VENIPUNCTURE: CPT | Performed by: NURSE PRACTITIONER

## 2022-09-22 PROCEDURE — 3008F PR BODY MASS INDEX (BMI) DOCUMENTED: ICD-10-PCS | Mod: CPTII,S$GLB,, | Performed by: NURSE PRACTITIONER

## 2022-09-22 PROCEDURE — 81003 POCT URINALYSIS, DIPSTICK, AUTOMATED, W/O SCOPE: ICD-10-PCS | Mod: QW,S$GLB,, | Performed by: NURSE PRACTITIONER

## 2022-09-22 PROCEDURE — 86592 SYPHILIS TEST NON-TREP QUAL: CPT | Performed by: NURSE PRACTITIONER

## 2022-09-22 PROCEDURE — 3074F PR MOST RECENT SYSTOLIC BLOOD PRESSURE < 130 MM HG: ICD-10-PCS | Mod: CPTII,S$GLB,, | Performed by: NURSE PRACTITIONER

## 2022-09-22 PROCEDURE — 99214 PR OFFICE/OUTPT VISIT, EST, LEVL IV, 30-39 MIN: ICD-10-PCS | Mod: S$GLB,,, | Performed by: NURSE PRACTITIONER

## 2022-09-22 PROCEDURE — 3074F SYST BP LT 130 MM HG: CPT | Mod: CPTII,S$GLB,, | Performed by: NURSE PRACTITIONER

## 2022-09-22 PROCEDURE — 81003 URINALYSIS AUTO W/O SCOPE: CPT | Mod: QW,S$GLB,, | Performed by: NURSE PRACTITIONER

## 2022-09-22 PROCEDURE — 3079F PR MOST RECENT DIASTOLIC BLOOD PRESSURE 80-89 MM HG: ICD-10-PCS | Mod: CPTII,S$GLB,, | Performed by: NURSE PRACTITIONER

## 2022-09-22 PROCEDURE — 87389 HIV-1 AG W/HIV-1&-2 AB AG IA: CPT | Performed by: NURSE PRACTITIONER

## 2022-09-22 PROCEDURE — 87591 N.GONORRHOEAE DNA AMP PROB: CPT | Performed by: NURSE PRACTITIONER

## 2022-09-22 PROCEDURE — 1159F PR MEDICATION LIST DOCUMENTED IN MEDICAL RECORD: ICD-10-PCS | Mod: CPTII,S$GLB,, | Performed by: NURSE PRACTITIONER

## 2022-09-22 NOTE — PROGRESS NOTES
"Subjective:       Patient ID: Jerry Dominique is a 21 y.o. male.    Vitals:  height is 6' 1" (1.854 m) and weight is 97.5 kg (215 lb). His temperature is 97.2 °F (36.2 °C). His blood pressure is 127/84 and his pulse is 70. His respiration is 18 and oxygen saturation is 98%.     Chief Complaint: Exposure to STD    Pt present for std check up.    Exposure to STD  Pertinent negatives include no abdominal pain, anorexia, chest pain, chills, constipation, coughing, diarrhea, discolored urine, dysuria, fever, flank pain, frequency, headaches, hematuria, hesitancy, joint pain, joint swelling, nausea, painful intercourse, rash, shortness of breath, sore throat, urgency, urinary retention or vomiting.     Constitution: Negative for chills and fever.   HENT:  Negative for sore throat.    Cardiovascular:  Negative for chest pain.   Respiratory:  Negative for cough and shortness of breath.    Gastrointestinal:  Negative for abdominal pain, nausea, vomiting, constipation and diarrhea.   Genitourinary:  Negative for dysuria, frequency, urgency and flank pain.   Skin:  Negative for rash.   Neurological:  Negative for headaches.          Past Medical History:   Diagnosis Date    Asthma          Past Surgical History:   Procedure Laterality Date    arm surgery Right     " Pins"            Social History     Socioeconomic History    Marital status: Single   Tobacco Use    Smoking status: Some Days     Types: Vaping with nicotine    Smokeless tobacco: Never    Tobacco comments:     Vape   Substance and Sexual Activity    Alcohol use: No    Drug use: No       History reviewed. No pertinent family history.    ROS:  Gen.: Denies fatigue, weight loss  Skin:  Denies  no rashes, itching or changes in skin color or texture  HEENT: Denies headache, nasal congestion, sneezing  Nodes: No masses or lesions  Chest: Denies cyanosis, wheezing, cough and sputum production  Cardiovascular: Denies chest pain, shortness of breath  Abdomen: Reports no " abdominal pain  Urinary: Reports possible STD exposure   : Possible STD exposure, no symptoms  Musculoskeletal: no joint stiffness, swelling. Denies back pain  Neurologic: History of seizures, paralysis, alteration of gait or coordination  Psychiatric: Denies mood swings, depression or suicidal    PE:  Appearance: Well-nourished, well-developed, in no acute distress, temp 97.2°, pulse ox 90%  V/S: Reviewed.  Skin: No masses, rashes or lesions  HEENT: turbinates pink, Mucous membranes okay, TMs clear bilateral   Chest: Lungs clear to auscultation.  Cardiovascular: Regular rate and rhythm.  Musculoskeletal: Motor: 5/5 strength major flexors/extensors.  Neurologic: No sensory deficits. Gait and posture: Normal, No cerebellar signs.   Mental status: Patient awake, alert and oriented    Plan:  Lab work POCT UA, chlamydia, Trichomonas, gonorrhea, HIV, RPR and hepatitis panel/patient requested  Advise increase p.o. fluids--at least 64 ounces of water/juice & rest  ADVISE USE OF CONDOMS  Practice good handwashing.  Increase fluids (avoid caffeine or sugary beverages as these will irritate the bladder).   Please follow up with your primary care provider if your symptoms do not improve within 2-3 days or sooner for any new or worsening symptoms.  For any severe pain, bright red blood in urine, difficulty urinating, fever that does not improve with Tylenol or Ibuprofen, inability to urinate, incontinence of urine or bowels, or for any other urgent concerns, please go to the ER for immediate evaluation. .      Diagnosis:  STD exposure

## 2022-09-22 NOTE — PATIENT INSTRUCTIONS
Plan:  Lab work POCT UA, chlamydia, Trichomonas, gonorrhea, HIV, RPR and hepatitis panel  Advise increase p.o. fluids--at least 64 ounces of water/juice & rest  ADVISE USE OF CONDOMS  Practice good handwashing.  Increase fluids (avoid caffeine or sugary beverages as these will irritate the bladder).   Please follow up with your primary care provider if your symptoms do not improve within 2-3 days or sooner for any new or worsening symptoms.  For any severe pain, bright red blood in urine, difficulty urinating, fever that does not improve with Tylenol or Ibuprofen, inability to urinate, incontinence of urine or bowels, or for any other urgent concerns, please go to the ER for immediate evaluation. .

## 2022-09-23 LAB
C TRACH DNA SPEC QL NAA+PROBE: NOT DETECTED
HAV IGM SERPL QL IA: NORMAL
HBV CORE IGM SERPL QL IA: NORMAL
HBV SURFACE AG SERPL QL IA: NORMAL
HCV AB SERPL QL IA: NORMAL
HIV 1+2 AB+HIV1 P24 AG SERPL QL IA: NORMAL
N GONORRHOEA DNA SPEC QL NAA+PROBE: NOT DETECTED
RPR SER QL: NORMAL

## 2022-09-25 ENCOUNTER — TELEPHONE (OUTPATIENT)
Dept: URGENT CARE | Facility: CLINIC | Age: 22
End: 2022-09-25
Payer: MEDICAID

## 2022-11-11 ENCOUNTER — PATIENT MESSAGE (OUTPATIENT)
Dept: RESEARCH | Facility: HOSPITAL | Age: 22
End: 2022-11-11
Payer: MEDICAID

## 2023-06-30 ENCOUNTER — PATIENT MESSAGE (OUTPATIENT)
Dept: RESEARCH | Facility: HOSPITAL | Age: 23
End: 2023-06-30
Payer: MEDICAID

## 2023-07-27 ENCOUNTER — OFFICE VISIT (OUTPATIENT)
Dept: URGENT CARE | Facility: CLINIC | Age: 23
End: 2023-07-27
Payer: MEDICAID

## 2023-07-27 VITALS
OXYGEN SATURATION: 98 % | BODY MASS INDEX: 28.49 KG/M2 | WEIGHT: 215 LBS | DIASTOLIC BLOOD PRESSURE: 92 MMHG | HEART RATE: 61 BPM | TEMPERATURE: 98 F | SYSTOLIC BLOOD PRESSURE: 148 MMHG | RESPIRATION RATE: 16 BRPM | HEIGHT: 73 IN

## 2023-07-27 DIAGNOSIS — R35.0 URINARY FREQUENCY: Primary | ICD-10-CM

## 2023-07-27 LAB
BILIRUB UR QL STRIP: NEGATIVE
GLUCOSE UR QL STRIP: NEGATIVE
KETONES UR QL STRIP: NEGATIVE
LEUKOCYTE ESTERASE UR QL STRIP: NEGATIVE
PH, POC UA: 6
POC BLOOD, URINE: NEGATIVE
POC NITRATES, URINE: NEGATIVE
PROT UR QL STRIP: NEGATIVE
SP GR UR STRIP: 1.02 (ref 1–1.03)
UROBILINOGEN UR STRIP-ACNC: NORMAL (ref 0.3–2.2)

## 2023-07-27 PROCEDURE — 81003 URINALYSIS AUTO W/O SCOPE: CPT | Mod: QW,S$GLB,,

## 2023-07-27 PROCEDURE — 81003 POCT URINALYSIS, DIPSTICK, AUTOMATED, W/O SCOPE: ICD-10-PCS | Mod: QW,S$GLB,,

## 2023-07-27 PROCEDURE — 99213 PR OFFICE/OUTPT VISIT, EST, LEVL III, 20-29 MIN: ICD-10-PCS | Mod: S$GLB,,,

## 2023-07-27 PROCEDURE — 99213 OFFICE O/P EST LOW 20 MIN: CPT | Mod: S$GLB,,,

## 2023-07-27 PROCEDURE — 87591 N.GONORRHOEAE DNA AMP PROB: CPT

## 2023-07-27 NOTE — PROGRESS NOTES
"Subjective:      Patient ID: Jerry Dominique is a 22 y.o. male.    Vitals:  height is 6' 1" (1.854 m) and weight is 97.5 kg (215 lb). His temperature is 98.1 °F (36.7 °C). His blood pressure is 148/92 (abnormal) and his pulse is 61. His respiration is 16 and oxygen saturation is 98%.     Chief Complaint: Urinary Frequency    Jerry Dominique is a 22 y.o. male who presents for increased urinary frequency and urgency which onset yesterday. He states that he will urinate and then soon after feel the urge to urinate again. Associated sxs include bladder "spasms". Patient denies any fever, chills, abd pain, flank pain, dysuria, hematuria, penile discharge, and decreased urination. No prior Tx included. No STD concerns. No new sexual partners.    Urinary Frequency   This is a new problem. The current episode started yesterday. The problem has been unchanged. The patient is experiencing no pain. There has been no fever. Associated symptoms include frequency and urgency. Pertinent negatives include no chills, discharge, flank pain, hematuria, hesitancy, nausea, vomiting, constipation, rash or withholding. He has tried nothing for the symptoms. There is no history of diabetes insipidus, diabetes mellitus, hypertension, kidney stones, recurrent UTIs, a single kidney or STD.     Constitution: Negative for chills and fever.   Gastrointestinal:  Negative for abdominal pain, nausea, vomiting, constipation and diarrhea.   Genitourinary:  Positive for frequency and urgency. Negative for dysuria, urine decreased, flank pain, bladder incontinence, hematuria, history of kidney stones, penile discharge and penile pain.   Skin:  Negative for rash.   Neurological:  Negative for dizziness, numbness and tingling.    Objective:     Physical Exam   Constitutional: He is oriented to person, place, and time. He appears well-developed.   HENT:   Head: Normocephalic and atraumatic.   Ears:   Right Ear: External ear normal.   Left Ear: External " ear normal.   Nose: Nose normal.   Mouth/Throat: Mucous membranes are normal.   Eyes: Conjunctivae and lids are normal.   Neck: Trachea normal. Neck supple.   Cardiovascular: Normal rate, regular rhythm and normal heart sounds.   Pulmonary/Chest: Effort normal and breath sounds normal. No respiratory distress.   Abdominal: Normal appearance and bowel sounds are normal. He exhibits no distension and no mass. Soft. There is no abdominal tenderness. There is no rebound, no guarding, no left CVA tenderness and no right CVA tenderness.   Musculoskeletal: Normal range of motion.         General: Normal range of motion.   Neurological: He is alert and oriented to person, place, and time. He has normal strength.   Skin: Skin is warm, dry, intact, not diaphoretic and not pale.   Psychiatric: His speech is normal and behavior is normal. Judgment and thought content normal.   Nursing note and vitals reviewed.    Assessment:     1. Urinary frequency        Results for orders placed or performed in visit on 07/27/23   POCT Urinalysis, Dipstick, Automated, W/O Scope   Result Value Ref Range    POC Blood, Urine Negative Negative    POC Bilirubin, Urine Negative Negative    POC Urobilinogen, Urine Norm 0.3 - 2.2    POC Ketones, Urine Negative Negative    POC Protein, Urine Negative Negative    POC Nitrates, Urine Negative Negative    POC Glucose, Urine Negative Negative    pH, UA 6.0     POC Specific Gravity, Urine 1.020 1.003 - 1.029    POC Leukocytes, Urine Negative Negative       Plan:       Urinary frequency  -     POCT Urinalysis, Dipstick, Automated, W/O Scope  -     C. trachomatis/N. gonorrhoeae by AMP [facility]; Urine    Afebrile. VSS.  UA negative. Discussed results with patient.  DDx: polyuria 2/2 DM or HLD, prostatitis, UTI  NG/CT ordered. Will notify patient with results in 3-5 days and tx as necessary.  Recommend patient follow up with PCP for follow up and annual visit.  Discussed lab work such as HgBA1C and lipid  pane;. Patient states he will wait until he can see his PCP.  Discussed Urology referral. Patient declined.  Recommend monitor fluid intake.  May take OTC AZO for symptom relief.  RTC as needed.

## 2023-07-30 LAB
C TRACH DNA SPEC QL NAA+PROBE: NOT DETECTED
N GONORRHOEA DNA SPEC QL NAA+PROBE: NOT DETECTED

## 2023-07-31 ENCOUNTER — TELEPHONE (OUTPATIENT)
Dept: URGENT CARE | Facility: CLINIC | Age: 23
End: 2023-07-31
Payer: MEDICAID

## 2023-08-01 NOTE — TELEPHONE ENCOUNTER
Discussed negative NG/CT results with patient. Symptoms have improved. No further questions or concerns.

## 2023-08-21 ENCOUNTER — HOSPITAL ENCOUNTER (EMERGENCY)
Facility: OTHER | Age: 23
Discharge: HOME | End: 2023-08-21
Attending: EMERGENCY MEDICINE
Payer: MEDICAID

## 2023-08-21 VITALS
RESPIRATION RATE: 18 BRPM | HEIGHT: 73 IN | OXYGEN SATURATION: 98 % | DIASTOLIC BLOOD PRESSURE: 83 MMHG | TEMPERATURE: 98 F | HEART RATE: 70 BPM | SYSTOLIC BLOOD PRESSURE: 140 MMHG | BODY MASS INDEX: 27.17 KG/M2 | WEIGHT: 205 LBS

## 2023-08-21 DIAGNOSIS — R39.15 URGENCY OF URINATION: ICD-10-CM

## 2023-08-21 DIAGNOSIS — R10.9 ABDOMINAL PAIN, UNSPECIFIED ABDOMINAL LOCATION: Primary | ICD-10-CM

## 2023-08-21 LAB
ALBUMIN SERPL BCP-MCNC: 4.4 G/DL (ref 3.5–5.2)
ALP SERPL-CCNC: 82 U/L (ref 55–135)
ALT SERPL W/O P-5'-P-CCNC: 64 U/L (ref 10–44)
ANION GAP SERPL CALC-SCNC: 9 MMOL/L (ref 8–16)
AST SERPL-CCNC: 20 U/L (ref 10–40)
BASOPHILS # BLD AUTO: 0.06 K/UL (ref 0–0.2)
BASOPHILS NFR BLD: 0.6 % (ref 0–1.9)
BILIRUB SERPL-MCNC: 0.4 MG/DL (ref 0.1–1)
BILIRUB UR QL STRIP: NEGATIVE
BUN SERPL-MCNC: 13 MG/DL (ref 6–20)
CALCIUM SERPL-MCNC: 10.1 MG/DL (ref 8.7–10.5)
CHLORIDE SERPL-SCNC: 106 MMOL/L (ref 95–110)
CLARITY UR: CLEAR
CO2 SERPL-SCNC: 26 MMOL/L (ref 23–29)
COLOR UR: YELLOW
CREAT SERPL-MCNC: 1 MG/DL (ref 0.5–1.4)
DIFFERENTIAL METHOD: ABNORMAL
EOSINOPHIL # BLD AUTO: 0.2 K/UL (ref 0–0.5)
EOSINOPHIL NFR BLD: 1.7 % (ref 0–8)
ERYTHROCYTE [DISTWIDTH] IN BLOOD BY AUTOMATED COUNT: 12.4 % (ref 11.5–14.5)
EST. GFR  (NO RACE VARIABLE): >60 ML/MIN/1.73 M^2
GLUCOSE SERPL-MCNC: 85 MG/DL (ref 70–110)
GLUCOSE UR QL STRIP: NEGATIVE
HCT VFR BLD AUTO: 47.8 % (ref 40–54)
HGB BLD-MCNC: 15.8 G/DL (ref 14–18)
HGB UR QL STRIP: NEGATIVE
IMM GRANULOCYTES # BLD AUTO: 0.06 K/UL (ref 0–0.04)
IMM GRANULOCYTES NFR BLD AUTO: 0.6 % (ref 0–0.5)
KETONES UR QL STRIP: NEGATIVE
LEUKOCYTE ESTERASE UR QL STRIP: NEGATIVE
LIPASE SERPL-CCNC: 36 U/L (ref 4–60)
LYMPHOCYTES # BLD AUTO: 1.4 K/UL (ref 1–4.8)
LYMPHOCYTES NFR BLD: 14.8 % (ref 18–48)
MCH RBC QN AUTO: 27.9 PG (ref 27–31)
MCHC RBC AUTO-ENTMCNC: 33.1 G/DL (ref 32–36)
MCV RBC AUTO: 84 FL (ref 82–98)
MONOCYTES # BLD AUTO: 0.6 K/UL (ref 0.3–1)
MONOCYTES NFR BLD: 6.7 % (ref 4–15)
NEUTROPHILS # BLD AUTO: 7.2 K/UL (ref 1.8–7.7)
NEUTROPHILS NFR BLD: 75.6 % (ref 38–73)
NITRITE UR QL STRIP: NEGATIVE
NRBC BLD-RTO: 0 /100 WBC
PH UR STRIP: 6 [PH] (ref 5–8)
PLATELET # BLD AUTO: 299 K/UL (ref 150–450)
PMV BLD AUTO: 9.4 FL (ref 9.2–12.9)
POTASSIUM SERPL-SCNC: 4.3 MMOL/L (ref 3.5–5.1)
PROT SERPL-MCNC: 7.9 G/DL (ref 6–8.4)
PROT UR QL STRIP: NEGATIVE
RBC # BLD AUTO: 5.67 M/UL (ref 4.6–6.2)
SODIUM SERPL-SCNC: 141 MMOL/L (ref 136–145)
SP GR UR STRIP: 1.03 (ref 1–1.03)
URN SPEC COLLECT METH UR: NORMAL
UROBILINOGEN UR STRIP-ACNC: NEGATIVE EU/DL
WBC # BLD AUTO: 9.51 K/UL (ref 3.9–12.7)

## 2023-08-21 PROCEDURE — 83690 ASSAY OF LIPASE: CPT | Performed by: PHYSICIAN ASSISTANT

## 2023-08-21 PROCEDURE — 85025 COMPLETE CBC W/AUTO DIFF WBC: CPT | Performed by: PHYSICIAN ASSISTANT

## 2023-08-21 PROCEDURE — 81003 URINALYSIS AUTO W/O SCOPE: CPT | Performed by: PHYSICIAN ASSISTANT

## 2023-08-21 PROCEDURE — 99283 EMERGENCY DEPT VISIT LOW MDM: CPT

## 2023-08-21 PROCEDURE — 63600175 PHARM REV CODE 636 W HCPCS: Performed by: PHYSICIAN ASSISTANT

## 2023-08-21 PROCEDURE — 80053 COMPREHEN METABOLIC PANEL: CPT | Performed by: PHYSICIAN ASSISTANT

## 2023-08-21 RX ORDER — HYOSCYAMINE SULFATE 0.5 MG/ML
0.5 INJECTION, SOLUTION SUBCUTANEOUS
Status: COMPLETED | OUTPATIENT
Start: 2023-08-21 | End: 2023-08-21

## 2023-08-21 RX ORDER — OXYBUTYNIN CHLORIDE 5 MG/1
5 TABLET ORAL 3 TIMES DAILY
Qty: 30 TABLET | Refills: 0 | Status: SHIPPED | OUTPATIENT
Start: 2023-08-21 | End: 2023-11-15

## 2023-08-21 RX ADMIN — HYOSCYAMINE SULFATE 0.5 MG: 0.5 INJECTION, SOLUTION SUBCUTANEOUS at 03:08

## 2023-08-21 NOTE — ED TRIAGE NOTES
Pt presents to ED c/o abd pain with urinary frequency x1 month. States pain sometimes radiates to penis. Was seen at ER last month and dx with possible ileus. Has since been unable to follow up with urology. No improvement in pain x1 month.

## 2023-08-21 NOTE — ED PROVIDER NOTES
"Encounter Date: 8/21/2023       History     Chief Complaint   Patient presents with    Abdominal Pain     Pt presents to the ER with complaints of periumbilical abdominal pain with urinary frequency for the past month; states pain sometimes radiates into the genitals. Pt was seen at an ER in [place] last month where has was diagnosed with a possible ileus and referred to a urologist. Pt states he was unable to follow up with the urologist; states pain persist.       Afebrile 22-year-old male with past medical history of asthma presents the ED for evaluation of intermittent abdominal pain.  Patient states that pain is located to the umbilicus and periumbilical region.  States it waxes and wanes in severity.  He also reports he has some urinary symptoms including frequency and some occasional urgency.  He does report sometimes the pain in abdomen radiates to genitals however denies anything constant.  He was evaluated at another emergency room at urgent care approximately 1 month ago for similar symptoms.  No infectious process was seen at that time.  Incidental finding of possible ileus on CT was referred to Urology however has not yet been able to follow as he recently moved to this area.  Denies any change in bowels.  Denies any vomiting.  Denies any fever chills, dysuria, hematuria or penile discharge.    The history is provided by the patient.     Review of patient's allergies indicates:  No Known Allergies  Past Medical History:   Diagnosis Date    Asthma      Past Surgical History:   Procedure Laterality Date    arm surgery Right     " Pins"     No family history on file.  Social History     Tobacco Use    Smoking status: Some Days     Current packs/day: 0.00     Types: Vaping with nicotine    Smokeless tobacco: Never    Tobacco comments:     Vape   Substance Use Topics    Alcohol use: No    Drug use: No     Review of Systems  See HPI  Physical Exam     Initial Vitals [08/21/23 1347]   BP Pulse Resp Temp " SpO2   (!) 142/88 79 16 98 °F (36.7 °C) 98 %      MAP       --         Physical Exam    Constitutional: Vital signs are normal. He appears well-developed and well-nourished. He is cooperative.  Non-toxic appearance. He does not appear ill.   HENT:   Head: Normocephalic and atraumatic.   Eyes: Conjunctivae and lids are normal.   Neck: Trachea normal. Neck supple. No stridor present. No tracheal deviation present.   Normal range of motion.  Cardiovascular:  Normal rate and regular rhythm.           Abdominal: Abdomen is soft. He exhibits no distension. There is abdominal tenderness in the periumbilical area.   Mild TTP of periumbilical region.  No guarding, rebound or rigidity.  No McBurney sign There is no rebound, no guarding and no tenderness at McBurney's point.   Musculoskeletal:      Cervical back: Normal range of motion and neck supple.     Neurological: He is alert and oriented to person, place, and time. GCS eye subscore is 4. GCS verbal subscore is 5. GCS motor subscore is 6.   Skin: Skin is warm, dry and intact. No rash noted.   Psychiatric: He has a normal mood and affect. His speech is normal and behavior is normal. Thought content normal.         ED Course   Procedures  Labs Reviewed   CBC W/ AUTO DIFFERENTIAL - Abnormal; Notable for the following components:       Result Value    Immature Granulocytes 0.6 (*)     Immature Grans (Abs) 0.06 (*)     Gran % 75.6 (*)     Lymph % 14.8 (*)     All other components within normal limits   COMPREHENSIVE METABOLIC PANEL - Abnormal; Notable for the following components:    ALT 64 (*)     All other components within normal limits   LIPASE   URINALYSIS, REFLEX TO URINE CULTURE    Narrative:     Specimen Source->Urine          Imaging Results    None          Medications   hyoscyamine injection 0.5 mg (0.5 mg Intravenous Given 8/21/23 1550)     Medical Decision Making  Amount and/or Complexity of Data Reviewed  External Data Reviewed: labs, radiology and notes.  Labs:  ordered.    Risk  Prescription drug management.                          Medical Decision Making:   History:   Old Medical Records: I decided to obtain old medical records.  Old Records Summarized: records from another hospital.  Initial Assessment:   Emergent evaluation 22-year-old male with ongoing intermittent periumbilical abdominal pain with associated urgency and urinary frequency.  He appears well in no distress.  Exam notable for mild TTP of the umbilicus.  No obvious hernia.  Negative McBurney point tenderness.  No Rovsing.  No signs of guarding, rebound or rigidity.  Remaining exam grossly unremarkable  Differential Diagnosis:   Differential Diagnosis includes, but is not limited to:  AAA, aortic dissection, mesenteric ischemia, perforated viscous, MI/ACS, SBO/volvulus, incarcerated/strangulated hernia, intussusception, ileus, appendicitis, cholecystitis, cholangitis, diverticulitis, esophagitis, hepatitis, nephrolithiasis, pancreatitis, gastroenteritis, colitis, IBD/IBS, biliary colic, GERD, PUD, constipation, UTI/pyelonephritis,  disorder.      Clinical Tests:   Lab Tests: Reviewed and Ordered  ED Management:  Plan for labs and will give anti spasmodic as in talking with him it sounds more cramping in nature and sporadic.  Labs reveal no leukocytosis or signs of infectious process in urine.  He had recent negative STI screening and denies any new partners concern.  Chemistry grossly unremarkable.  Did review CT interpretation of recent ER visit and did not feel acute process today hence did not want to reexpose him to significant radiation.  Emphasized that he should likely have follow-up with Urology given the symptoms.  Of note remotely he had epididymal cyst.  This may be loosely contributed however did not feel needed workup currently.  Patient was agreeable to trial of anti spasmodic and will send with oxybutynin. Strict instructions to follow up with primary care physician or reference provided  for further assessment and evaluation. Given instructions to return for any acute symptoms and verbalized understanding of this medical plan.        Clinical Impression:   Final diagnoses:  [R10.9] Abdominal pain, unspecified abdominal location (Primary)  [R39.15] Urgency of urination        ED Disposition Condition    Discharge Stable          ED Prescriptions       Medication Sig Dispense Start Date End Date Auth. Provider    oxybutynin (DITROPAN) 5 MG Tab Take 1 tablet (5 mg total) by mouth 3 (three) times daily. 30 tablet 8/21/2023 8/20/2024 Aparna Hewitt PA          Follow-up Information       Follow up With Specialties Details Why Contact Info    [facility] [place] Primary [facility] Schedule an appointment as soon as possible for a visit   [address] [address] [phone]    [facility] Surgical Oncology, Orthopedic Surgery, Genetics, Physical Medicine and Rehabilitation, Occupational Therapy, Radiology Schedule an appointment as soon as possible for a visit   [address] [facility] [address]  [phone]               Aparna Hewitt PA  08/23/23 1224

## 2023-10-02 ENCOUNTER — TELEPHONE (OUTPATIENT)
Dept: UROLOGY | Facility: CLINIC | Age: 23
End: 2023-10-02
Payer: MEDICAID

## 2023-10-02 NOTE — TELEPHONE ENCOUNTER
Pt was contacted, pt will change pcp to [facility] provider.  ----- Message from Radha Friend sent at 10/2/2023 11:00 AM CDT -----  Regarding: Return call  .Type:  Patient Returning Call    Who Called: self     Who Left Message for Patient: Denise Diaz MA    Does the patient know what this is regarding?:yes     Would the patient rather a call back or a response via [facility]? Call     Best Call Back Number:.[phone]      Additional Information:

## 2023-10-02 NOTE — TELEPHONE ENCOUNTER
Pt cannot be seen due to no [facility] pcp and has medicaid.  ----- Message from Jack Solomon sent at 10/2/2023 10:32 AM CDT -----  Regarding: Jerry  Type:  Sooner Appointment Request     Patient is requesting a sooner appointment.  Patient declined first available appointment listed as well as another facility and provider .  Patient will not accept being placed on the waitlist and is requesting a message be sent to doctor.     Name of Caller: Jerry     When is the first available appointment? 11/15    Symptoms: follow up abdominal pain     Would the patient rather a call back or a response via [facility]? Callback     Best Call Back Number: .[phone]      Additional Information:

## 2023-11-15 ENCOUNTER — OFFICE VISIT (OUTPATIENT)
Dept: UROLOGY | Facility: CLINIC | Age: 23
End: 2023-11-15
Attending: UROLOGY
Payer: MEDICAID

## 2023-11-15 VITALS
BODY MASS INDEX: 27.16 KG/M2 | DIASTOLIC BLOOD PRESSURE: 95 MMHG | WEIGHT: 211.63 LBS | HEIGHT: 74 IN | HEART RATE: 100 BPM | OXYGEN SATURATION: 97 % | SYSTOLIC BLOOD PRESSURE: 161 MMHG

## 2023-11-15 DIAGNOSIS — N50.3 EPIDIDYMAL CYST: ICD-10-CM

## 2023-11-15 DIAGNOSIS — R39.15 URGENCY OF URINATION: Primary | ICD-10-CM

## 2023-11-15 DIAGNOSIS — R10.13 EPIGASTRIC PAIN: ICD-10-CM

## 2023-11-15 PROCEDURE — 3077F SYST BP >= 140 MM HG: CPT | Mod: CPTII,S$GLB,, | Performed by: UROLOGY

## 2023-11-15 PROCEDURE — 3008F PR BODY MASS INDEX (BMI) DOCUMENTED: ICD-10-PCS | Mod: CPTII,S$GLB,, | Performed by: UROLOGY

## 2023-11-15 PROCEDURE — 99204 PR OFFICE/OUTPT VISIT, NEW, LEVL IV, 45-59 MIN: ICD-10-PCS | Mod: S$GLB,,, | Performed by: UROLOGY

## 2023-11-15 PROCEDURE — 3080F DIAST BP >= 90 MM HG: CPT | Mod: CPTII,S$GLB,, | Performed by: UROLOGY

## 2023-11-15 PROCEDURE — 99204 OFFICE O/P NEW MOD 45 MIN: CPT | Mod: S$GLB,,, | Performed by: UROLOGY

## 2023-11-15 PROCEDURE — 1159F PR MEDICATION LIST DOCUMENTED IN MEDICAL RECORD: ICD-10-PCS | Mod: CPTII,S$GLB,, | Performed by: UROLOGY

## 2023-11-15 PROCEDURE — 3080F PR MOST RECENT DIASTOLIC BLOOD PRESSURE >= 90 MM HG: ICD-10-PCS | Mod: CPTII,S$GLB,, | Performed by: UROLOGY

## 2023-11-15 PROCEDURE — 1160F PR REVIEW ALL MEDS BY PRESCRIBER/CLIN PHARMACIST DOCUMENTED: ICD-10-PCS | Mod: CPTII,S$GLB,, | Performed by: UROLOGY

## 2023-11-15 PROCEDURE — 3008F BODY MASS INDEX DOCD: CPT | Mod: CPTII,S$GLB,, | Performed by: UROLOGY

## 2023-11-15 PROCEDURE — 1159F MED LIST DOCD IN RCRD: CPT | Mod: CPTII,S$GLB,, | Performed by: UROLOGY

## 2023-11-15 PROCEDURE — 3077F PR MOST RECENT SYSTOLIC BLOOD PRESSURE >= 140 MM HG: ICD-10-PCS | Mod: CPTII,S$GLB,, | Performed by: UROLOGY

## 2023-11-15 PROCEDURE — 1160F RVW MEDS BY RX/DR IN RCRD: CPT | Mod: CPTII,S$GLB,, | Performed by: UROLOGY

## 2023-11-15 RX ORDER — OXYBUTYNIN CHLORIDE 5 MG/1
5 TABLET, EXTENDED RELEASE ORAL DAILY PRN
Qty: 30 TABLET | Refills: 11 | Status: SHIPPED | OUTPATIENT
Start: 2023-11-15 | End: 2024-11-14

## 2023-11-15 NOTE — PROGRESS NOTES
"Subjective:       Jerry Dominique is a 23 y.o. male who is a new patient who was referred by Aparna Hewitt  for evaluation of urgency.      He was seen in ER in August for abdominal pain (epigastric area - described as poking) that radiates to penis. He was seen in ER in [place] prior to that and diagnosed with ileus based on CT. He was given Ditropan from ER in August for suspected bladder spasms.     He described irritated bladder that caused frequency. Penile pain can occur with or without LUTS - pain towards of penis - usually at end of void. Last pain occurred about a month ago. No aggravating/alleviating factors - though maybe constipation may make it worse. Ditropan was helpful for urgency. Last used 1 month ago, rx ran out. Occasional dysuria. No hematuria. Strong stream. Currently without LUTS though can be variable. No obvious dietary triggers.     DONNA 2/18 - b/l microlithiasis, small 1.2cm R epi head cyst  CT a/p 7/23 - normal , possible mild ileus  [facility] 9/23 - normal, b/l microlithiasis, small R epi head cyst  Abd US 9/23 - normal      The following portions of the patient's history were reviewed and updated as appropriate: allergies, current medications, past family history, past medical history, past social history, past surgical history and problem list.    Review of Systems  Twelve point review of systems completed. Pertinent positive and negatives listed in HPI.      Objective:    Vitals: BP (!) 161/95 (BP Location: Right arm, Patient Position: Sitting, BP Method: Large (Automatic))   Pulse 100   Ht 6' 1.5" (1.867 m)   Wt 96 kg (211 lb 10.3 oz)   SpO2 97%   BMI 27.54 kg/m²     Physical Exam   General: well developed, well nourished in no acute distress  Head: normocephalic, atraumatic  Neck: no obvious enlargement of thyroid  HEENT: EOMI, mucus membranes moist, sclera anicteric, no hearing impairment  Lungs: symmetric expansion, non-labored breathing  Skin: no rashes or lesions  Abd: soft, " "NT, ND, no masses  Neuro: alert and oriented x 3, no gross deficits  Psych: normal judgment and insight, normal mood/affect and non-anxious  Genitourinary:   Penis - uncircumcised penis without plaques, lesions, or scarring.  Urethra - orthotopic location without stenosis.  Scrotum  - no lesions or rashes, no hydrocele or hernia.  Testes - descended bilaterally, symmetric without masses, non tender.  Epididymides - small epi head cyst on R, symmetric.   KIP: deferred      Lab Review   Urine analysis today in clinic shows +trace protein     Lab Results   Component Value Date    WBC 9.51 08/21/2023    HGB 15.8 08/21/2023    HCT 47.8 08/21/2023    MCV 84 08/21/2023     08/21/2023     Lab Results   Component Value Date    CREATININE 1.0 08/21/2023    BUN 13 08/21/2023     No results found for: "PSA"  No results found for: "PSADIAG"    Imaging  DONNA, CT reviewed  Reports and images were personally reviewed by me and discussed with the patient today       Assessment/Plan:      1. Urgency of urination    - Unsure etiology. Discussed dietary triggers   - Ditropan XL PRN. Discussed SE.      2. Epigastric pain    - ?constipation related   - No obvious cause on imaging   - Lipase normal     3. Epididymal cyst    - No need for intervention. Reassurance provided.      4. Penile pain   - Suspected referred pain from bladder spasm   - Ditropan XL 5mg PRN    Recommend f/u with PCP re: HTN and epigastric pain      Follow up PRN         "

## 2023-11-18 ENCOUNTER — HOSPITAL ENCOUNTER (EMERGENCY)
Facility: HOSPITAL | Age: 23
Discharge: HOME | End: 2023-11-19
Attending: EMERGENCY MEDICINE
Payer: MEDICAID

## 2023-11-18 DIAGNOSIS — R06.4 HYPERVENTILATION: Primary | ICD-10-CM

## 2023-11-18 DIAGNOSIS — R06.02 SHORTNESS OF BREATH: ICD-10-CM

## 2023-11-18 LAB
ALBUMIN SERPL BCP-MCNC: 4.7 G/DL (ref 3.5–5.2)
ALLENS TEST: ABNORMAL
ALP SERPL-CCNC: 94 U/L (ref 55–135)
ALT SERPL W/O P-5'-P-CCNC: 96 U/L (ref 10–44)
AMPHET+METHAMPHET UR QL: NEGATIVE
ANION GAP SERPL CALC-SCNC: 17 MMOL/L (ref 8–16)
AST SERPL-CCNC: 49 U/L (ref 10–40)
B-OH-BUTYR BLD STRIP-SCNC: 0.3 MMOL/L (ref 0–0.5)
BARBITURATES UR QL SCN>200 NG/ML: NEGATIVE
BASOPHILS # BLD AUTO: 0.1 K/UL (ref 0–0.2)
BASOPHILS NFR BLD: 1 % (ref 0–1.9)
BENZODIAZ UR QL SCN>200 NG/ML: NEGATIVE
BILIRUB SERPL-MCNC: 0.8 MG/DL (ref 0.1–1)
BILIRUB UR QL STRIP: NEGATIVE
BUN SERPL-MCNC: 13 MG/DL (ref 6–20)
BUN SERPL-MCNC: 13 MG/DL (ref 6–30)
BZE UR QL SCN: NEGATIVE
CALCIUM SERPL-MCNC: 10.6 MG/DL (ref 8.7–10.5)
CANNABINOIDS UR QL SCN: NEGATIVE
CHLORIDE SERPL-SCNC: 102 MMOL/L (ref 95–110)
CHLORIDE SERPL-SCNC: 104 MMOL/L (ref 95–110)
CLARITY UR REFRACT.AUTO: ABNORMAL
CO2 SERPL-SCNC: 20 MMOL/L (ref 23–29)
COLOR UR AUTO: ABNORMAL
CREAT SERPL-MCNC: 0.8 MG/DL (ref 0.5–1.4)
CREAT SERPL-MCNC: 1.1 MG/DL (ref 0.5–1.4)
CREAT UR-MCNC: 37 MG/DL (ref 23–375)
DIFFERENTIAL METHOD: ABNORMAL
EOSINOPHIL # BLD AUTO: 0.1 K/UL (ref 0–0.5)
EOSINOPHIL NFR BLD: 0.7 % (ref 0–8)
ERYTHROCYTE [DISTWIDTH] IN BLOOD BY AUTOMATED COUNT: 12.2 % (ref 11.5–14.5)
EST. GFR  (NO RACE VARIABLE): >60 ML/MIN/1.73 M^2
ETHANOL SERPL-MCNC: <10 MG/DL
GLUCOSE SERPL-MCNC: 112 MG/DL (ref 70–110)
GLUCOSE SERPL-MCNC: 115 MG/DL (ref 70–110)
GLUCOSE UR QL STRIP: NEGATIVE
HCO3 UR-SCNC: 19 MMOL/L (ref 24–28)
HCO3 UR-SCNC: 21.3 MMOL/L (ref 24–28)
HCT VFR BLD AUTO: 47.9 % (ref 40–54)
HCT VFR BLD CALC: 48 %PCV (ref 36–54)
HGB BLD-MCNC: 16.5 G/DL (ref 14–18)
HGB UR QL STRIP: NEGATIVE
IMM GRANULOCYTES # BLD AUTO: 0.07 K/UL (ref 0–0.04)
IMM GRANULOCYTES NFR BLD AUTO: 0.7 % (ref 0–0.5)
KETONES UR QL STRIP: NEGATIVE
LDH SERPL L TO P-CCNC: 3.58 MMOL/L (ref 0.5–2.2)
LEUKOCYTE ESTERASE UR QL STRIP: NEGATIVE
LIPASE SERPL-CCNC: 27 U/L (ref 4–60)
LYMPHOCYTES # BLD AUTO: 2.9 K/UL (ref 1–4.8)
LYMPHOCYTES NFR BLD: 29.2 % (ref 18–48)
MAGNESIUM SERPL-MCNC: 2.2 MG/DL (ref 1.6–2.6)
MCH RBC QN AUTO: 28.2 PG (ref 27–31)
MCHC RBC AUTO-ENTMCNC: 34.4 G/DL (ref 32–36)
MCV RBC AUTO: 82 FL (ref 82–98)
METHADONE UR QL SCN>300 NG/ML: NEGATIVE
MONOCYTES # BLD AUTO: 0.5 K/UL (ref 0.3–1)
MONOCYTES NFR BLD: 5.1 % (ref 4–15)
NEUTROPHILS # BLD AUTO: 6.3 K/UL (ref 1.8–7.7)
NEUTROPHILS NFR BLD: 63.3 % (ref 38–73)
NITRITE UR QL STRIP: NEGATIVE
NRBC BLD-RTO: 0 /100 WBC
OPIATES UR QL SCN: NEGATIVE
PCO2 BLDA: 17.4 MMHG (ref 35–45)
PCO2 BLDA: 19.8 MMHG (ref 35–45)
PCP UR QL SCN>25 NG/ML: NEGATIVE
PH SMN: 7.64 [PH] (ref 7.35–7.45)
PH SMN: 7.64 [PH] (ref 7.35–7.45)
PH UR STRIP: 8 [PH] (ref 5–8)
PLATELET # BLD AUTO: 365 K/UL (ref 150–450)
PMV BLD AUTO: 9.5 FL (ref 9.2–12.9)
PO2 BLDA: 25 MMHG (ref 40–60)
PO2 BLDA: 40 MMHG (ref 40–60)
POC BE: -2 MMOL/L
POC BE: 0 MMOL/L
POC IONIZED CALCIUM: 1.17 MMOL/L (ref 1.06–1.42)
POC SATURATED O2: 63 % (ref 95–100)
POC SATURATED O2: 88 % (ref 95–100)
POC TCO2 (MEASURED): 18 MMOL/L (ref 23–29)
POC TCO2: 20 MMOL/L (ref 24–29)
POC TCO2: 22 MMOL/L (ref 24–29)
POTASSIUM BLD-SCNC: 3.3 MMOL/L (ref 3.5–5.1)
POTASSIUM SERPL-SCNC: 3.4 MMOL/L (ref 3.5–5.1)
PROT SERPL-MCNC: 8.7 G/DL (ref 6–8.4)
PROT UR QL STRIP: NEGATIVE
RBC # BLD AUTO: 5.85 M/UL (ref 4.6–6.2)
SAMPLE: ABNORMAL
SITE: ABNORMAL
SODIUM BLD-SCNC: 142 MMOL/L (ref 136–145)
SODIUM SERPL-SCNC: 139 MMOL/L (ref 136–145)
SP GR UR STRIP: 1.01 (ref 1–1.03)
TOXICOLOGY INFORMATION: NORMAL
TSH SERPL DL<=0.005 MIU/L-ACNC: 0.99 UIU/ML (ref 0.4–4)
URN SPEC COLLECT METH UR: ABNORMAL
WBC # BLD AUTO: 10 K/UL (ref 3.9–12.7)

## 2023-11-18 PROCEDURE — 80053 COMPREHEN METABOLIC PANEL: CPT

## 2023-11-18 PROCEDURE — 99285 EMERGENCY DEPT VISIT HI MDM: CPT | Mod: 25

## 2023-11-18 PROCEDURE — 85025 COMPLETE CBC W/AUTO DIFF WBC: CPT

## 2023-11-18 PROCEDURE — 83605 ASSAY OF LACTIC ACID: CPT

## 2023-11-18 PROCEDURE — 82803 BLOOD GASES ANY COMBINATION: CPT

## 2023-11-18 PROCEDURE — 87040 BLOOD CULTURE FOR BACTERIA: CPT

## 2023-11-18 PROCEDURE — 81003 URINALYSIS AUTO W/O SCOPE: CPT | Mod: 59

## 2023-11-18 PROCEDURE — 94761 N-INVAS EAR/PLS OXIMETRY MLT: CPT | Mod: XB

## 2023-11-18 PROCEDURE — 82010 KETONE BODYS QUAN: CPT

## 2023-11-18 PROCEDURE — 99900035 HC TECH TIME PER 15 MIN (STAT)

## 2023-11-18 PROCEDURE — 84443 ASSAY THYROID STIM HORMONE: CPT

## 2023-11-18 PROCEDURE — 63600175 PHARM REV CODE 636 W HCPCS

## 2023-11-18 PROCEDURE — 83735 ASSAY OF MAGNESIUM: CPT

## 2023-11-18 PROCEDURE — 93005 ELECTROCARDIOGRAM TRACING: CPT

## 2023-11-18 PROCEDURE — 83690 ASSAY OF LIPASE: CPT

## 2023-11-18 PROCEDURE — 93010 EKG 12-LEAD: ICD-10-PCS | Mod: ,,, | Performed by: INTERNAL MEDICINE

## 2023-11-18 PROCEDURE — 96374 THER/PROPH/DIAG INJ IV PUSH: CPT

## 2023-11-18 PROCEDURE — 80307 DRUG TEST PRSMV CHEM ANLYZR: CPT

## 2023-11-18 PROCEDURE — 82077 ASSAY SPEC XCP UR&BREATH IA: CPT

## 2023-11-18 PROCEDURE — 96361 HYDRATE IV INFUSION ADD-ON: CPT

## 2023-11-18 PROCEDURE — 93010 ELECTROCARDIOGRAM REPORT: CPT | Mod: ,,, | Performed by: INTERNAL MEDICINE

## 2023-11-18 RX ORDER — ONDANSETRON 2 MG/ML
4 INJECTION INTRAMUSCULAR; INTRAVENOUS
Status: COMPLETED | OUTPATIENT
Start: 2023-11-18 | End: 2023-11-18

## 2023-11-18 RX ADMIN — ONDANSETRON 4 MG: 2 INJECTION INTRAMUSCULAR; INTRAVENOUS at 11:11

## 2023-11-18 RX ADMIN — SODIUM CHLORIDE, POTASSIUM CHLORIDE, SODIUM LACTATE AND CALCIUM CHLORIDE 1000 ML: 600; 310; 30; 20 INJECTION, SOLUTION INTRAVENOUS at 11:11

## 2023-11-19 VITALS
SYSTOLIC BLOOD PRESSURE: 134 MMHG | TEMPERATURE: 98 F | HEART RATE: 86 BPM | RESPIRATION RATE: 20 BRPM | OXYGEN SATURATION: 98 % | DIASTOLIC BLOOD PRESSURE: 79 MMHG

## 2023-11-19 LAB
ALLENS TEST: ABNORMAL
ALLENS TEST: NORMAL
HCO3 UR-SCNC: 21.1 MMOL/L (ref 24–28)
LACTATE SERPL-SCNC: 3.6 MMOL/L (ref 0.5–2.2)
LDH SERPL L TO P-CCNC: 0.76 MMOL/L (ref 0.5–2.2)
PCO2 BLDA: 33.1 MMHG (ref 35–45)
PH SMN: 7.41 [PH] (ref 7.35–7.45)
PO2 BLDA: 37 MMHG (ref 40–60)
POC BE: -4 MMOL/L
POC SATURATED O2: 72 % (ref 95–100)
POC TCO2: 22 MMOL/L (ref 24–29)
SAMPLE: ABNORMAL
SAMPLE: NORMAL
SITE: ABNORMAL
SITE: NORMAL

## 2023-11-19 PROCEDURE — 99900035 HC TECH TIME PER 15 MIN (STAT)

## 2023-11-19 PROCEDURE — 82803 BLOOD GASES ANY COMBINATION: CPT

## 2023-11-19 PROCEDURE — 83605 ASSAY OF LACTIC ACID: CPT

## 2023-11-19 PROCEDURE — 25000003 PHARM REV CODE 250: Performed by: EMERGENCY MEDICINE

## 2023-11-19 RX ORDER — HYDROXYZINE HYDROCHLORIDE 25 MG/1
25 TABLET, FILM COATED ORAL 3 TIMES DAILY PRN
Qty: 20 TABLET | Refills: 0 | Status: SHIPPED | OUTPATIENT
Start: 2023-11-19

## 2023-11-19 RX ADMIN — SODIUM CHLORIDE 1000 ML: 9 INJECTION, SOLUTION INTRAVENOUS at 01:11

## 2023-11-19 NOTE — ED TRIAGE NOTES
Jerry Dominique, a 23 y.o. male presents to the ED w/ complaint of shortness of breath since Wednesday. Pt was recently admitted for similar symptoms. Reports hx of asthma. Pt states he used his inhaler @ home w/o relief. Pt also endorsing shakiness and L arm tightness.    Triage note:  Chief Complaint   Patient presents with    Shortness of Breath     Patient reports that SOB that began Wednesday. Patient was recently admitted for similar symptoms. Patient reports history of asthma. Patient reports use of inhaler at home without relief. States that he feels shaky as well.      Review of patient's allergies indicates:  No Known Allergies  Past Medical History:   Diagnosis Date    Asthma

## 2023-11-19 NOTE — ED PROVIDER NOTES
"Encounter Date: 11/18/2023       History     Chief Complaint   Patient presents with    Shortness of Breath     Patient reports that SOB that began Wednesday. Patient was recently admitted for similar symptoms. Patient reports history of asthma. Patient reports use of inhaler at home without relief. States that he feels shaky as well.      Patient is a 23-year-old male with medical history of asthma that presents to emergency department with generalized weakness.  Patient states that on Wednesday he went to the emergency department for shortness of breath.  They treated him for a asthma exacerbation at that time.  He was discharged home and continued to feel ill.  States that he is weak, shaky and has had nausea.  He is not vomited.  He denies abdominal pain.  He denies diarrhea.  Denies any ETOH or other substance use.  Denies any recent trauma.  States that he is had all these symptoms for 3 days and that they continue the worst that is why they presented to the emergency department today.  Denies subjective fevers, denies congestion, denies cough, denies myalgias.    He does admit increased urinary frequency and increased thirst.    The history is provided by the patient.     Review of patient's allergies indicates:  No Known Allergies  Past Medical History:   Diagnosis Date    Asthma      Past Surgical History:   Procedure Laterality Date    arm surgery Right     " Pins"     No family history on file.  Social History     Tobacco Use    Smoking status: Some Days     Types: Vaping with nicotine    Smokeless tobacco: Never    Tobacco comments:     Vape   Substance Use Topics    Alcohol use: No    Drug use: No     Review of Systems  ROS negative except as noted in HPI     Physical Exam     Initial Vitals [11/18/23 2151]   BP Pulse Resp Temp SpO2   (!) 173/89 (!) 117 (!) 22 97.7 °F (36.5 °C) 100 %      MAP       --         Physical Exam    Nursing note and vitals reviewed.  Constitutional: He appears " well-developed and well-nourished. He appears distressed.   HENT:   Head: Normocephalic.   Right Ear: External ear normal.   Left Ear: External ear normal.   Nose: Nose normal.   Mouth/Throat: Oropharynx is clear and moist.   Eyes: Conjunctivae and EOM are normal. Right eye exhibits no discharge. Left eye exhibits no discharge. No scleral icterus.   Neck: No JVD present.   Cardiovascular:  Regular rhythm, S1 normal, S2 normal, normal heart sounds, intact distal pulses and normal pulses.   Tachycardia present.         Pulmonary/Chest: Breath sounds normal. No accessory muscle usage. Tachypnea noted. He is in respiratory distress. He has no wheezes. He has no rales.   Abdominal: Abdomen is soft. He exhibits no distension. There is no abdominal tenderness. There is no rebound.   Musculoskeletal:         General: No tenderness or edema. Normal range of motion.     Neurological: He is alert and oriented to person, place, and time.   Generalized weakness compared to base line     Skin: Skin is warm and dry. Capillary refill takes less than 2 seconds. No abscess noted. No erythema.   Psychiatric: He has a normal mood and affect.       ED Course   Procedures  Labs Reviewed   CBC W/ AUTO DIFFERENTIAL - Abnormal; Notable for the following components:       Result Value    Immature Granulocytes 0.7 (*)     Immature Grans (Abs) 0.07 (*)     All other components within normal limits   COMPREHENSIVE METABOLIC PANEL - Abnormal; Notable for the following components:    Potassium 3.4 (*)     CO2 20 (*)     Glucose 112 (*)     Calcium 10.6 (*)     Total Protein 8.7 (*)     AST 49 (*)     ALT 96 (*)     Anion Gap 17 (*)     All other components within normal limits   URINALYSIS, REFLEX TO URINE CULTURE - Abnormal; Notable for the following components:    Appearance, UA Hazy (*)     All other components within normal limits    Narrative:     Specimen Source->Urine   LACTIC ACID, PLASMA - Abnormal; Notable for the following components:     Lactate (Lactic Acid) 3.6 (*)     All other components within normal limits   ISTAT LACTATE - Abnormal; Notable for the following components:    POC Lactate 3.58 (*)     All other components within normal limits   ISTAT PROCEDURE - Abnormal; Notable for the following components:    POC PH 7.641 (*)     POC PCO2 19.8 (*)     POC PO2 25 (*)     POC HCO3 21.3 (*)     POC TCO2 22 (*)     All other components within normal limits   ISTAT PROCEDURE - Abnormal; Notable for the following components:    POC PH 7.645 (*)     POC PCO2 17.4 (*)     POC HCO3 19.0 (*)     POC TCO2 20 (*)     All other components within normal limits   ISTAT PROCEDURE - Abnormal; Notable for the following components:    POC Glucose 115 (*)     POC Potassium 3.3 (*)     POC TCO2 (MEASURED) 18 (*)     All other components within normal limits   ISTAT PROCEDURE - Abnormal; Notable for the following components:    POC PCO2 33.1 (*)     POC PO2 37 (*)     POC HCO3 21.1 (*)     POC BE -4 (*)     POC TCO2 22 (*)     All other components within normal limits   CULTURE, BLOOD   CULTURE, BLOOD   LIPASE   MAGNESIUM   TSH   BETA - HYDROXYBUTYRATE, SERUM   DRUG SCREEN PANEL, URINE EMERGENCY    Narrative:     Specimen Source->Urine   ALCOHOL,MEDICAL (ETHANOL)   ISTAT LACTATE     EKG Readings: (Independently Interpreted)   Sinus tachycardia, no ST elevations or depressions       Imaging Results              X-Ray Chest AP Portable (Final result)  Result time 11/18/23 23:30:20      Final result by Jag Steele MD (11/18/23 23:30:20)                   Impression:      No acute findings in the chest.      Electronically signed by: Jag Steele MD  Date:    11/18/2023  Time:    23:30               Narrative:    EXAMINATION:  XR CHEST AP PORTABLE    CLINICAL HISTORY:  Chest Pain;    TECHNIQUE:  Single frontal view of the chest was performed.    COMPARISON:  None    FINDINGS:  No consolidation, pleural effusion or pneumothorax.    Cardiomediastinal  silhouette is unremarkable.                                       Medications   lactated ringers bolus 1,000 mL (0 mLs Intravenous Stopped 11/19/23 0007)   ondansetron injection 4 mg (4 mg Intravenous Given 11/18/23 2302)   sodium chloride 0.9% bolus 1,000 mL 1,000 mL (0 mLs Intravenous Stopped 11/19/23 0226)     Medical Decision Making  Patient is a 23-year-old male with medical history of asthma that presents to emergency department with generalized weakness.      On initial evaluation patient was hypertensive, tachycardic and tachypneic.  He was speaking in full sentences however patient's family states that he was mildly confused.    Differential:  Considering DKA, metabolic abnormalities, sepsis, anxiety, asthma exacerbation or pneumonia    Interventions:  Given 1 L normal saline, Zofran IV.    Workup to include complete metabolic workup, beta hydroxybutyrate, VBG, lactic acid, urinalysis, urine drug screen, EKG and chest x-ray    Workup described in ED course.  Workup relatively unremarkable except for respiratory alkalosis.  This is consistent with his hyper ventilation.  We suspect this is secondary to a underlying anxiety.  Rest of workup for infection, DKA, hyperglycemia all negative.     Patient has alkalosis improved after time in the ER.  Lactic acidosis also improved after time in the ER.  No evidence of infections suggest he would need further treatment inpatient or with antibiotics.  Stable for discharge at this time.  Instructed to follow up with his PCP.  We will give a prescription hydroxyzine for next time he feels these symptoms.  Patient given ER return precautions.  He expressed understanding and had no further questions or concerns.         Amount and/or Complexity of Data Reviewed  Labs: ordered. Decision-making details documented in ED Course.  Radiology: ordered.    Risk  Prescription drug management.              Attending Attestation:   Physician Attestation Statement for Resident:  As  the supervising MD   Physician Attestation Statement: I have personally seen and examined this patient.   I agree with the above history.  -:   As the supervising MD I agree with the above PE.     As the supervising MD I agree with the above treatment, course, plan, and disposition.                  ED Course as of 11/19/23 0555   Sat Nov 18, 2023   2302 ISTAT Lactate(!!)  Lactic acidosis, unclear etiology   [TK]   2303 Beta - Hydroxybutyrate, Serum  Negative BHB, no DKA   [TK]   2303 CBC auto differential(!)  Unremarkable CBC   [TK]   2321 ISTAT PROCEDURE(!!)  Respiratory alkalosis, possibly secondary to anxiety. Doesn't correlate with elevated Lactic acid.  [TK]   Sun Nov 19, 2023   0056 Comprehensive metabolic panel(!)  Metabolic acidosis with anion gap.  [TK]      ED Course User Index  [TK] Harvey Campa DO                        Clinical Impression:  Final diagnoses:  [R06.02] Shortness of breath  [R06.4] Hyperventilation (Primary)          ED Disposition Condition    Discharge Stable          ED Prescriptions       Medication Sig Dispense Start Date End Date Auth. Provider    hydrOXYzine HCL (ATARAX) 25 MG tablet Take 1 tablet (25 mg total) by mouth 3 (three) times daily as needed for Anxiety. 20 tablet 11/19/2023 -- Harvey Campa DO          Follow-up Information       Follow up With Specialties Details Why Contact Info    Maria Eugenia Umaña MD Family Medicine   [address] [facility] [address]  [phone]      [facility] - Emergency Dept Emergency Medicine  If symptoms worsen [address] [facility] [address] [phone]             Harvey Campa DO  Resident  11/19/23 0555       Mallorie Pollock MD  11/21/23 [id number]

## 2023-11-24 LAB
BACTERIA BLD CULT: NORMAL
BACTERIA BLD CULT: NORMAL

## 2024-05-17 NOTE — PATIENT INSTRUCTIONS
You must understand that you have received Urgent Care treatment only and that you may be released before all of your medical problems are known or treated.   Return to Urgent Care or go to ER if symptoms worsen or fail to improve.  Follow up with PCP as recommended for further management.   We will notify you of any test results (if applicable) in 3-5 days.      
24

## 2024-09-10 ENCOUNTER — OFFICE VISIT (OUTPATIENT)
Dept: NEUROLOGY | Facility: CLINIC | Age: 24
End: 2024-09-10
Payer: COMMERCIAL

## 2024-09-10 VITALS — WEIGHT: 230 LBS | BODY MASS INDEX: 30.48 KG/M2 | HEIGHT: 73 IN

## 2024-09-10 DIAGNOSIS — I10 HYPERTENSION, UNSPECIFIED TYPE: ICD-10-CM

## 2024-09-10 DIAGNOSIS — R51.9 CHRONIC DAILY HEADACHE: ICD-10-CM

## 2024-09-10 DIAGNOSIS — G44.40 MEDICATION OVERUSE HEADACHE: ICD-10-CM

## 2024-09-10 DIAGNOSIS — R51.9 INTRACTABLE HEADACHE, UNSPECIFIED CHRONICITY PATTERN, UNSPECIFIED HEADACHE TYPE: Primary | ICD-10-CM

## 2024-09-10 DIAGNOSIS — G43.719 INTRACTABLE CHRONIC MIGRAINE WITHOUT AURA AND WITHOUT STATUS MIGRAINOSUS: ICD-10-CM

## 2024-09-10 DIAGNOSIS — F41.9 ANXIETY: ICD-10-CM

## 2024-09-10 DIAGNOSIS — J45.909 ASTHMA, UNSPECIFIED ASTHMA SEVERITY, UNSPECIFIED WHETHER COMPLICATED, UNSPECIFIED WHETHER PERSISTENT: ICD-10-CM

## 2024-09-10 PROCEDURE — 99205 OFFICE O/P NEW HI 60 MIN: CPT | Mod: S$GLB,,, | Performed by: PHYSICIAN ASSISTANT

## 2024-09-10 PROCEDURE — 3008F BODY MASS INDEX DOCD: CPT | Mod: CPTII,S$GLB,, | Performed by: PHYSICIAN ASSISTANT

## 2024-09-10 PROCEDURE — 1159F MED LIST DOCD IN RCRD: CPT | Mod: CPTII,S$GLB,, | Performed by: PHYSICIAN ASSISTANT

## 2024-09-10 PROCEDURE — 99999 PR PBB SHADOW E&M-EST. PATIENT-LVL III: CPT | Mod: PBBFAC,,, | Performed by: PHYSICIAN ASSISTANT

## 2024-09-10 PROCEDURE — G2211 COMPLEX E/M VISIT ADD ON: HCPCS | Mod: S$GLB,,, | Performed by: PHYSICIAN ASSISTANT

## 2024-09-10 PROCEDURE — 1160F RVW MEDS BY RX/DR IN RCRD: CPT | Mod: CPTII,S$GLB,, | Performed by: PHYSICIAN ASSISTANT

## 2024-09-10 RX ORDER — UBROGEPANT 50 MG/1
TABLET ORAL
Qty: 16 TABLET | Refills: 5 | Status: SHIPPED | OUTPATIENT
Start: 2024-09-10

## 2024-09-10 RX ORDER — METHYLPREDNISOLONE 4 MG/1
TABLET ORAL
Qty: 1 EACH | Refills: 0 | Status: SHIPPED | OUTPATIENT
Start: 2024-09-10

## 2024-09-10 RX ORDER — UBROGEPANT 50 MG/1
TABLET ORAL
Qty: 16 TABLET | Refills: 5 | Status: SHIPPED | OUTPATIENT
Start: 2024-09-10 | End: 2024-09-10

## 2024-09-10 RX ORDER — METOPROLOL TARTRATE 25 MG/1
25 TABLET, FILM COATED ORAL DAILY
COMMUNITY

## 2024-09-10 RX ORDER — VERAPAMIL HYDROCHLORIDE 120 MG/1
120 CAPSULE, EXTENDED RELEASE ORAL DAILY
Qty: 30 CAPSULE | Refills: 5 | Status: SHIPPED | OUTPATIENT
Start: 2024-09-10 | End: 2025-03-09

## 2024-09-10 NOTE — PROGRESS NOTES
New Patient     SUBJECTIVE:  Patient ID: Jerry Dominique   MRN: [id number]  Referred By: Aaareferral Self  Chief Complaint: Headache      History of Present Illness:   23 y.o. male with ha's, asthma, anxiety, HTN, who presents to clinic with mother Acacia for evaluation of headaches.   PMHx negative for TBI, Meningitis, Aneurysms, Kidney Stones, GI bleed, osteoporosis, CAD/MI, CVA/TIA, DM, cancer  Family Hx + for Migraines in sister    Pt has a h/o ha's since his childhood resolvng w/ ibuprofen or tylenol. However, over the  last year he feels his ha's have worsened and occur w/ associated symptoms including photophobia, phonophobia, brain fog, lightheadedness, imbalance, dizziness, blurry vision, nausea, and diarrhea. Pain can be mild to debilitating w/ severity ranges of 1-10/10. However, last debilitating ha's were in December 2023. Ha's are daily/constant. He cannot recall the last time he was pain free, maybe a few months ago. Ha's are further described as a pressure, heaviness, squeezing, throbbing, pulsating pain in his bilateral frontalis or occipitalis regions. Pain is worsened by lights, noise, and bending over. It is alleviated by laying down in a dark room. He felt ha's worsened around the time of stopping vaping and nicotine use, and when he began experiencing anxiety/panic attacks. He also feels his bp's have been uncontrolled in the last year. He was given metoprolol 25mg but takes a half a tab daily due to side effects. He currently takes advil 400mg q 6 hrs approximately 3 days a week.  He also takes tylenol 500mg once a week. His last eye exam was many yrs ago. He does endorse trouble falling and staying sleeping but only in the last 1-2 weeks. He endorses snoring but denies awakening gasping/choking or witnessed apneic events. Denies all other ROS including osmophobia, vomiting, rhinorrhea, nasal congestion, neck pain, swollen/droopy eyelid, swelling around the eye, excessive eye tearing,  "conjunctival injection, red/flushed face, facial sweating, tinnitus, whooshing sounds, dysarthria, ataxia, paresthesias, loc, sz's, weakness, trauma, fevers.     Treatments Tried   Propranolol - avoid 2/2 asthma hx  Metoprolol - brain fog  Advil  Tylenol   Triptans - avoid 2/2 uncontrolled bp's    Social History  Alcohol - socially   Smoke - vapes occasionally   Recreational Drug Use- denies  Occupation -     Current Medications:    Current Outpatient Medications:     albuterol (PROVENTIL/VENTOLIN HFA) 90 mcg/actuation inhaler, Inhale into the lungs., Disp: , Rfl:     methylPREDNISolone (MEDROL DOSEPACK) 4 mg tablet, use as directed, Disp: 1 each, Rfl: 0    metoprolol tartrate (LOPRESSOR) 25 MG tablet, Take 25 mg by mouth once daily., Disp: , Rfl:     ubrogepant (UBRELVY) 50 mg tablet, Take 1 tablet by mouth at the onset of a headache. May repeat based on response and tolerability after more than 2 hours if needed. Do not take more than 200mg in a 24 hour span., Disp: 16 tablet, Rfl: 5    verapamiL (VERELAN) 120 MG C24P, Take 1 capsule (120 mg total) by mouth once daily., Disp: 30 capsule, Rfl: 5    Review of Systems - as per HPI, otherwise a balanced 10 systems review is negative.    OBJECTIVE:  Vitals:  Ht 6' 1" (1.854 m)   Wt 104.3 kg (230 lb)   BMI 30.34 kg/m²     Physical Exam   Constitutional: he appears well-developed and well-nourished. he is well groomed. NAD  HENT:    Head: Normocephalic and atraumatic, Frontalis was NTTP, temporalis was NTTP   Eyes: Conjunctivae and EOM are normal. Pupils are equal, round, and reactive to light   Neck: Neck supple. Occiput and trapezius NTTP   Musculoskeletal: Normal range of motion. No joint stiffness. No vertebral point tenderness.  Skin: Skin is warm and dry.  Psychiatric: Normal mood and affect.     Neuro exam:    Mental status:  The patient is alert and oriented to person, place and time.  Language is intact and fluent  Remote and recent memory are " intact  Normal attention and concentration  Mood is stable    Cranial Nerves:  Pupils are equal and reactive to light.    Extraocular movements are intact and without nystagmus.    Facial movement is symmetric.  Facial sensation is intact.    Hearing is intact   FROM of neck in all (6) directions without pain  Shoulder shrug symmetrical.    Coordination:     Finger to nose - normal and symmetric bilaterally     Motor:  Normal muscle bulk and symmetry. No fasciculations were noted.   Tremor not apparent   Pronator drift not apparent.    strength was strong and symmetric  RUE:appropriate against gravity and medium force as tested 5/5  LUE: appropriate against gravity and medium force as tested 5/5  RLE:appropriate against gravity and medium force as tested 5/5              LLE: appropriate against gravity and medium force as tested 5/5    Sensory:  RUE  intact light touch  LUE intact light touch  RLE intact light touch  LLE intact light touch    Gait:   Normal gait    Review of Data:   Notes from cardiology, pcp reviewed   Labs:  No visits with results within 3 Month(s) from this visit.   Latest known visit with results is:   Admission on 11/18/2023, Discharged on 11/19/2023   Component Date Value Ref Range Status    WBC 11/18/2023 10.00  3.90 - 12.70 K/uL Final    RBC 11/18/2023 5.85  4.60 - 6.20 M/uL Final    Hemoglobin 11/18/2023 16.5  14.0 - 18.0 g/dL Final    Hematocrit 11/18/2023 47.9  40.0 - 54.0 % Final    MCV 11/18/2023 82  82 - 98 fL Final    MCH 11/18/2023 28.2  27.0 - 31.0 pg Final    MCHC 11/18/2023 34.4  32.0 - 36.0 g/dL Final    RDW 11/18/2023 12.2  11.5 - 14.5 % Final    Platelets 11/18/2023 365  150 - 450 K/uL Final    MPV 11/18/2023 9.5  9.2 - 12.9 fL Final    Immature Granulocytes 11/18/2023 0.7 (H)  0.0 - 0.5 % Final    Gran # (ANC) 11/18/2023 6.3  1.8 - 7.7 K/uL Final    Immature Grans (Abs) 11/18/2023 0.07 (H)  0.00 - 0.04 K/uL Final    Lymph # 11/18/2023 2.9  1.0 - 4.8 K/uL Final    Mono #  11/18/2023 0.5  0.3 - 1.0 K/uL Final    Eos # 11/18/2023 0.1  0.0 - 0.5 K/uL Final    Baso # 11/18/2023 0.10  0.00 - 0.20 K/uL Final    nRBC 11/18/2023 0  0 /100 WBC Final    Gran % 11/18/2023 63.3  38.0 - 73.0 % Final    Lymph % 11/18/2023 29.2  18.0 - 48.0 % Final    Mono % 11/18/2023 5.1  4.0 - 15.0 % Final    Eosinophil % 11/18/2023 0.7  0.0 - 8.0 % Final    Basophil % 11/18/2023 1.0  0.0 - 1.9 % Final    Differential Method 11/18/2023 Automated   Final    Sodium 11/18/2023 139  136 - 145 mmol/L Final    Potassium 11/18/2023 3.4 (L)  3.5 - 5.1 mmol/L Final    Chloride 11/18/2023 102  95 - 110 mmol/L Final    CO2 11/18/2023 20 (L)  23 - 29 mmol/L Final    Glucose 11/18/2023 112 (H)  70 - 110 mg/dL Final    BUN 11/18/2023 13  6 - 20 mg/dL Final    Creatinine 11/18/2023 1.1  0.5 - 1.4 mg/dL Final    Calcium 11/18/2023 10.6 (H)  8.7 - 10.5 mg/dL Final    Total Protein 11/18/2023 8.7 (H)  6.0 - 8.4 g/dL Final    Albumin 11/18/2023 4.7  3.5 - 5.2 g/dL Final    Total Bilirubin 11/18/2023 0.8  0.1 - 1.0 mg/dL Final    Alkaline Phosphatase 11/18/2023 94  55 - 135 U/L Final    AST 11/18/2023 49 (H)  10 - 40 U/L Final    ALT 11/18/2023 96 (H)  10 - 44 U/L Final    eGFR 11/18/2023 >60.0  >60 mL/min/1.73 m^2 Final    Anion Gap 11/18/2023 17 (H)  8 - 16 mmol/L Final    Specimen UA 11/18/2023 Urine, Clean Catch   Final    Color, UA 11/18/2023 Straw  Yellow, Straw, Mandy Final    Appearance, UA 11/18/2023 Hazy (A)  Clear Final    pH, UA 11/18/2023 8.0  5.0 - 8.0 Final    Specific Gravity, UA 11/18/2023 1.010  1.005 - 1.030 Final    Protein, UA 11/18/2023 Negative  Negative Final    Glucose, UA 11/18/2023 Negative  Negative Final    Ketones, UA 11/18/2023 Negative  Negative Final    Bilirubin (UA) 11/18/2023 Negative  Negative Final    Occult Blood UA 11/18/2023 Negative  Negative Final    Nitrite, UA 11/18/2023 Negative  Negative Final    Leukocytes, UA 11/18/2023 Negative  Negative Final    Lipase 11/18/2023 27  4 - 60 U/L  Final    Magnesium 11/18/2023 2.2  1.6 - 2.6 mg/dL Final    TSH 11/18/2023 0.987  0.400 - 4.000 uIU/mL Final    Blood Culture, Routine 11/18/2023 No growth after 5 days.   Final    Blood Culture, Routine 11/18/2023 No growth after 5 days.   Final    Beta-Hydroxybutyrate 11/18/2023 0.3  0.0 - 0.5 mmol/L Final    POC Lactate 11/18/2023 3.58 (HH)  0.5 - 2.2 mmol/L Final    Sample 11/18/2023 VENOUS   Final    Site 11/18/2023 Other   Final    Allens Test 11/18/2023 N/A   Final    POC PH 11/18/2023 7.641 (HH)  7.35 - 7.45 Final    POC PCO2 11/18/2023 19.8 (L)  35 - 45 mmHg Final    POC PO2 11/18/2023 25 (LL)  40 - 60 mmHg Final    POC HCO3 11/18/2023 21.3 (L)  24 - 28 mmol/L Final    POC BE 11/18/2023 0  -2 to 2 mmol/L Final    POC SATURATED O2 11/18/2023 63  95 - 100 % Final    POC TCO2 11/18/2023 22 (L)  24 - 29 mmol/L Final    Sample 11/18/2023 VENOUS   Final    Site 11/18/2023 Other   Final    Allens Test 11/18/2023 N/A   Final    POC PH 11/18/2023 7.645 (HH)  7.35 - 7.45 Final    POC PCO2 11/18/2023 17.4 (L)  35 - 45 mmHg Final    POC PO2 11/18/2023 40  40 - 60 mmHg Final    POC HCO3 11/18/2023 19.0 (L)  24 - 28 mmol/L Final    POC BE 11/18/2023 -2  -2 to 2 mmol/L Final    POC SATURATED O2 11/18/2023 88  95 - 100 % Final    POC TCO2 11/18/2023 20 (L)  24 - 29 mmol/L Final    Sample 11/18/2023 VENOUS   Final    Site 11/18/2023 Other   Final    Allens Test 11/18/2023 N/A   Final    POC Glucose 11/18/2023 115 (H)  70 - 110 mg/dL Final    POC BUN 11/18/2023 13  6 - 30 mg/dL Final    POC Creatinine 11/18/2023 0.8  0.5 - 1.4 mg/dL Final    POC Sodium 11/18/2023 142  136 - 145 mmol/L Final    POC Potassium 11/18/2023 3.3 (L)  3.5 - 5.1 mmol/L Final    POC Chloride 11/18/2023 104  95 - 110 mmol/L Final    POC TCO2 (MEASURED) 11/18/2023 18 (L)  23 - 29 mmol/L Final    POC Ionized Calcium 11/18/2023 1.17  1.06 - 1.42 mmol/L Final    POC Hematocrit 11/18/2023 48  36 - 54 %PCV Final    Sample 11/18/2023 LADAN   Final     Benzodiazepines 11/18/2023 Negative  Negative Final    Methadone metabolites 11/18/2023 Negative  Negative Final    Cocaine (Metab.) 11/18/2023 Negative  Negative Final    Opiate Scrn, Ur 11/18/2023 Negative  Negative Final    Barbiturate Screen, Ur 11/18/2023 Negative  Negative Final    Amphetamine Screen, Ur 11/18/2023 Negative  Negative Final    THC 11/18/2023 Negative  Negative Final    Phencyclidine 11/18/2023 Negative  Negative Final    Creatinine, Urine 11/18/2023 37.0  23.0 - 375.0 mg/dL Final    Toxicology Information 11/18/2023 SEE COMMENT   Final    Alcohol, Serum 11/18/2023 <10  <10 mg/dL Final    Lactate (Lactic Acid) 11/18/2023 3.6 (HH)  0.5 - 2.2 mmol/L Final    POC PH 11/19/2023 7.412  7.35 - 7.45 Final    POC PCO2 11/19/2023 33.1 (L)  35 - 45 mmHg Final    POC PO2 11/19/2023 37 (LL)  40 - 60 mmHg Final    POC HCO3 11/19/2023 21.1 (L)  24 - 28 mmol/L Final    POC BE 11/19/2023 -4 (L)  -2 to 2 mmol/L Final    POC SATURATED O2 11/19/2023 72  95 - 100 % Final    POC TCO2 11/19/2023 22 (L)  24 - 29 mmol/L Final    Sample 11/19/2023 VENOUS   Final    Site 11/19/2023 Other   Final    Allens Test 11/19/2023 N/A   Final    POC Lactate 11/19/2023 0.76  0.5 - 2.2 mmol/L Final    Sample 11/19/2023 VENOUS   Final    Site 11/19/2023 Other   Final    Allens Test 11/19/2023 N/A   Final     Imaging:  No results found for this or any previous visit.  Note: I have independently reviewed any/all imaging/labs/tests and agree with the report (s) as documented.  Any discrepancies will be as noted/demarcated by free text.  JOI CLARKE 9/10/2024    ASSESSMENT:  1. Intractable headache, unspecified chronicity pattern, unspecified headache type    2. Intractable chronic migraine without aura and without status migrainosus    3. Medication overuse headache    4. Chronic daily headache    5. Hypertension, unspecified type    6. Asthma, unspecified asthma severity, unspecified whether complicated, unspecified whether persistent     7. Anxiety          PLAN:  - Discussed symptoms appear to be consistent with migraines and rebound ha's, discussed treatment options and patient agreed with the following plan:  - ppx- start verapamil for dual purposes ha's and bp  - abortive - try ubrelvy 50mg prn  - rebound ha - d/c advil and tylenol, steroid taper to bridge  - obtain Mri brain to r/o secondary causes for changes to ha's  - d/c vaping  - obtain annual eye exam  - HTN - uncontrolled, avoid triptans, mgmt per pcp and cardiology  - asthma - avoid bb, mgmt per pcp  - anxiety - consider anti-depressants for dual purpose ha's, anxiety, and sleep, if verapamil fails, mgmt per pcp  - risks, benefits, and potential side effects of verapamil, ubrelvy, advil, tylenol, medrol dose pack discussed   - alternative treatment options offered   - importance of healthy diet, regular exercise and sleep hygiene in the treatment of headaches    - Start tracking headaches via Mig[facility] jarret on phone   - RTC after imaging     Orders Placed This Encounter    MRI Brain W WO Contrast    methylPREDNISolone (MEDROL DOSEPACK) 4 mg tablet    verapamiL (VERELAN) 120 MG C24P    ubrogepant (UBRELVY) 50 mg tablet       I have discussed realistic goals of care with patient at length as well as medication options, and need for lifestyle adjustment. I have explained that treatment will take time. We have agreed that the goal will be to reduce frequency/intensity/quantity of HA, not to be completely HA free. I have explained my non narcotic policy regarding headache treatment.    Patient agreeable to work on lifestyle adjustments.    Questions and concerns were sought and answered to the patient's stated verbal satisfaction.  The patient verbalizes understanding and agreement with the above stated treatment plan.     CC: Maria Eugenia Umaña MD Sarena Patel, PA-C  [facility]  [phone]    Dr. Yen was available during today's encounter.     Visit today  included increased complexity associated with the care of the episodic problem migraines addressed and managing the longitudinal care of the patient due to the serious and/or complex managed problem(s) migraines.    I spent 67 minutes on the day of this encounter preparing for, treating and managing this patient presenting with headaches.

## 2024-09-23 ENCOUNTER — HOSPITAL ENCOUNTER (OUTPATIENT)
Dept: RADIOLOGY | Facility: HOSPITAL | Age: 24
Discharge: HOME | End: 2024-09-23
Attending: PHYSICIAN ASSISTANT
Payer: COMMERCIAL

## 2024-09-23 DIAGNOSIS — R51.9 INTRACTABLE HEADACHE, UNSPECIFIED CHRONICITY PATTERN, UNSPECIFIED HEADACHE TYPE: ICD-10-CM

## 2024-09-23 PROCEDURE — 25500020 PHARM REV CODE 255: Performed by: PHYSICIAN ASSISTANT

## 2024-09-23 PROCEDURE — 70553 MRI BRAIN STEM W/O & W/DYE: CPT | Mod: 26,,, | Performed by: RADIOLOGY

## 2024-09-23 PROCEDURE — 70553 MRI BRAIN STEM W/O & W/DYE: CPT | Mod: TC

## 2024-09-23 PROCEDURE — A9585 GADOBUTROL INJECTION: HCPCS | Performed by: PHYSICIAN ASSISTANT

## 2024-09-23 RX ORDER — GADOBUTROL 604.72 MG/ML
10 INJECTION INTRAVENOUS
Status: COMPLETED | OUTPATIENT
Start: 2024-09-23 | End: 2024-09-23

## 2024-09-23 RX ADMIN — GADOBUTROL 10 ML: 604.72 INJECTION INTRAVENOUS at 07:09

## 2024-09-26 ENCOUNTER — HOSPITAL ENCOUNTER (EMERGENCY)
Facility: HOSPITAL | Age: 24
Discharge: HOME | End: 2024-09-26
Attending: STUDENT IN AN ORGANIZED HEALTH CARE EDUCATION/TRAINING PROGRAM
Payer: COMMERCIAL

## 2024-09-26 VITALS
SYSTOLIC BLOOD PRESSURE: 140 MMHG | HEART RATE: 70 BPM | TEMPERATURE: 98 F | DIASTOLIC BLOOD PRESSURE: 89 MMHG | OXYGEN SATURATION: 95 % | RESPIRATION RATE: 18 BRPM

## 2024-09-26 DIAGNOSIS — F41.9 ANXIETY: Primary | ICD-10-CM

## 2024-09-26 LAB
OHS QRS DURATION: 82 MS
OHS QTC CALCULATION: 398 MS

## 2024-09-26 PROCEDURE — 25000003 PHARM REV CODE 250: Performed by: STUDENT IN AN ORGANIZED HEALTH CARE EDUCATION/TRAINING PROGRAM

## 2024-09-26 PROCEDURE — 99283 EMERGENCY DEPT VISIT LOW MDM: CPT | Mod: 25

## 2024-09-26 PROCEDURE — 93005 ELECTROCARDIOGRAM TRACING: CPT

## 2024-09-26 PROCEDURE — 93010 ELECTROCARDIOGRAM REPORT: CPT | Mod: ,,, | Performed by: INTERNAL MEDICINE

## 2024-09-26 RX ORDER — DIAZEPAM 2 MG/1
2 TABLET ORAL
Status: COMPLETED | OUTPATIENT
Start: 2024-09-26 | End: 2024-09-26

## 2024-09-26 RX ADMIN — DIAZEPAM 2 MG: 2 TABLET ORAL at 05:09

## 2024-09-26 NOTE — ED NOTES
Patient identifiers for Jerry Dominique 23 y.o. male checked and correct.  Chief Complaint   Patient presents with    Panic Attack     Chest pain and anxiety on and off for the last day, mid sternal chest pain non radiating. Endorses nausea     Past Medical History:   Diagnosis Date    Asthma      Allergies reported: Review of patient's allergies indicates:  No Known Allergies      HEENT: Denies vision changes. Denies ear drainage or hearing loss. No c/o nasal drainage. Denies dysphagia or voice changes.   Appearance: Pt awake, alert & oriented to person, place & time. Pt in no acute distress at present time. Pt is clean and well groomed with clothes appropriately fastened.   Skin: Skin warm, dry & intact. Color consistent with ethnicity. Mucous membranes moist. No breakdown or brusing noted.   Musculoskeletal: Patient moving all extremities well, no obvious swelling or deformities noted.   Respiratory: Respirations spontaneous, even, and non-labored. Visible chest rise noted. Airway is open and patent. No accessory muscle use noted.   Neurologic: Sensation is intact. Speech is clear and appropriate. Eyes open spontaneously, behavior appropriate to situation, follows commands, facial expression symmetrical, bilateral hand grasp equal and even, purposeful motor response noted.  Cardiac: All peripheral pulses present. No Bilateral lower extremity edema. Cap refill is <3 seconds.+chest pain  Abdomen: Abdomen soft, non distended, non tender to palpation. +nausea  : Pt voids independently, denies dysuria, hematuria, frequency.

## 2024-09-26 NOTE — ED PROVIDER NOTES
"Encounter Date: 9/26/2024       History     Chief Complaint   Patient presents with    Panic Attack     Chest pain and anxiety on and off for the last day, mid sternal chest pain non radiating. Endorses nausea     23 year old male with  history of anxiety presents for a panic attack, including palpitations, perseverative thoughts and chest tightness, intermittent, worse with thinking about his anxiety, not improved with home metoprolol. He recently started sertraline 2 days ago and the symptoms have since worsened. He denies SI, HI or AVH.      Review of patient's allergies indicates:  No Known Allergies  Past Medical History:   Diagnosis Date    Asthma      Past Surgical History:   Procedure Laterality Date    arm surgery Right     " Pins"     No family history on file.  Social History     Tobacco Use    Smoking status: Some Days     Types: Vaping with nicotine    Smokeless tobacco: Never    Tobacco comments:     Vape   Substance Use Topics    Alcohol use: No    Drug use: No     Review of Systems   Constitutional:  Negative for activity change and appetite change.   HENT:  Negative for congestion and drooling.    Eyes:  Negative for discharge and itching.   Respiratory:  Negative for cough and chest tightness.    Cardiovascular:  Negative for chest pain and leg swelling.   Gastrointestinal:  Negative for abdominal distention and abdominal pain.   Genitourinary:  Negative for difficulty urinating and dysuria.   Musculoskeletal:  Negative for arthralgias.   Skin:  Negative for color change and pallor.   Neurological:  Negative for dizziness and facial asymmetry.   Psychiatric/Behavioral:  Negative for agitation and behavioral problems. The patient is nervous/anxious.        Physical Exam     Initial Vitals [09/26/24 0329]   BP Pulse Resp Temp SpO2   (!) 173/98 97 (!) 21 97.7 °F (36.5 °C) 98 %      MAP       --         Physical Exam    Nursing note and vitals reviewed.  Constitutional: He appears well-developed and " well-nourished.   HENT:   Head: Normocephalic and atraumatic.   Mouth/Throat: Oropharynx is clear and moist.   Eyes: Conjunctivae and EOM are normal. Pupils are equal, round, and reactive to light.   Neck: No thyromegaly present.   Normal range of motion.  Cardiovascular:  Normal rate, regular rhythm and intact distal pulses.           Pulmonary/Chest: Breath sounds normal. No respiratory distress. He has no wheezes.   Abdominal: Abdomen is soft. Bowel sounds are normal. He exhibits no distension. There is no abdominal tenderness.   Musculoskeletal:         General: No tenderness or edema. Normal range of motion.      Cervical back: Normal range of motion.     Neurological: He is alert and oriented to person, place, and time. He has normal strength. No cranial nerve deficit.   Skin: Skin is warm and dry. No rash noted.   Psychiatric: He has a normal mood and affect. His behavior is normal. Thought content normal.         ED Course   Procedures  Labs Reviewed - No data to display  EKG Readings: (Independently Interpreted)   EKG with regular rate, regular rhythm, normal axis, no acute ST elevations or depressions, normal VT, QRS and QT interval. Interpreted by me.         Imaging Results    None          Medications   diazePAM tablet 2 mg (2 mg Oral Given 9/26/24 0503)     Medical Decision Making  Well appearing, pleasant 23 year old male presents for evaluation of anxiety. Vitals normal. He denies SI, HI or AVH, and he's not gravely disabled. he's linear and goal directed but anxious about medications, their side effects and his overall health. Will give PO valium for temporary anxiolysis and advised outpatient PCP follow-up. He's comfortable with the plan.    Risk  Prescription drug management.                                      Clinical Impression:  Final diagnoses:  [F41.9] Anxiety (Primary)          ED Disposition Condition    Discharge Stable          ED Prescriptions    None       Follow-up Information        Follow up With Specialties Details Why Contact Info    Maria Eugenia Umaña MD Family Medicine Call in 1 day To set up a follow-up appointment, To recheck today's symptoms [address] [facility] [address]  [phone]               Morgan Perera MD  09/26/24 0712

## 2024-09-30 DIAGNOSIS — G43.719 INTRACTABLE CHRONIC MIGRAINE WITHOUT AURA AND WITHOUT STATUS MIGRAINOSUS: ICD-10-CM

## 2024-10-01 RX ORDER — VERAPAMIL HYDROCHLORIDE 120 MG/1
120 CAPSULE, EXTENDED RELEASE ORAL DAILY
Qty: 30 CAPSULE | Refills: 5 | Status: SHIPPED | OUTPATIENT
Start: 2024-10-01 | End: 2025-03-30

## 2024-10-21 ENCOUNTER — OFFICE VISIT (OUTPATIENT)
Dept: NEUROLOGY | Facility: CLINIC | Age: 24
End: 2024-10-21
Payer: COMMERCIAL

## 2024-10-21 DIAGNOSIS — G43.709 CHRONIC MIGRAINE WITHOUT AURA WITHOUT STATUS MIGRAINOSUS, NOT INTRACTABLE: Primary | ICD-10-CM

## 2024-10-21 PROCEDURE — G2211 COMPLEX E/M VISIT ADD ON: HCPCS | Mod: 95,,, | Performed by: PHYSICIAN ASSISTANT

## 2024-10-21 PROCEDURE — 1159F MED LIST DOCD IN RCRD: CPT | Mod: CPTII,95,, | Performed by: PHYSICIAN ASSISTANT

## 2024-10-21 PROCEDURE — 1160F RVW MEDS BY RX/DR IN RCRD: CPT | Mod: CPTII,95,, | Performed by: PHYSICIAN ASSISTANT

## 2024-10-21 PROCEDURE — 99214 OFFICE O/P EST MOD 30 MIN: CPT | Mod: 95,,, | Performed by: PHYSICIAN ASSISTANT

## 2024-10-21 NOTE — PROGRESS NOTES
Established Patient     The patient location is: [place]  The chief complaint leading to consultation is: headache follow up visit    Visit type: audiovisual    Face to Face time with patient: 13 min  20 minutes of total time spent on the encounter, which includes face to face time and non-face to face time preparing to see the patient (eg, review of tests), Obtaining and/or reviewing separately obtained history, Documenting clinical information in the electronic or other health record, Independently interpreting results (not separately reported) and communicating results to the patient/family/caregiver, or Care coordination (not separately reported).     Each patient to whom he or she provides medical services by telemedicine is:  (1) informed of the relationship between the physician and patient and the respective role of any other health care provider with respect to management of the patient; and (2) notified that he or she may decline to receive medical services by telemedicine and may withdraw from such care at any time.    Notes:        SUBJECTIVE:  Patient ID: Jerry Dominique   Chief Complaint: Headache    History of Present Illness:  Jerry Dominique is a 24 y.o. male with ha's, asthma, anxiety, HTN who presents via virtual visit alone for follow-up of headaches.       10/21/2024 - Interval History:  Reviewed imaging results and answered pt's questions to pt's verbal satisfaction.   Pt reports ha's have improved greatly since last visit and are now occurring 3 days a week. Did not start verapamil or steroid taper as he had a panic attack at [facility] and has not returned since. He has begun zoloft for this and pending appt w/ psychiatrist. Regarding his ha's, he did try ubrelvy 50mg prn twice but is unsure if it is helping. He also stopped taking otc's and vaping.   Plan: take ubrelvy 50mg prn, track ha's, continue anxiety mgmt per psych, consider verapamil if ha frequency remains elevated and send to mailing  pharmacy, rtc 2-3 mo    Recommendations made at last Office Visit on 9/10/24:  - Discussed symptoms appear to be consistent with migraines and rebound ha's, discussed treatment options and patient agreed with the following plan:  - ppx- start verapamil for dual purposes ha's and bp  - abortive - try ubrelvy 50mg prn  - rebound ha - d/c advil and tylenol, steroid taper to bridge  - obtain Mri brain to r/o secondary causes for changes to ha's  - d/c vaping  - obtain annual eye exam  - HTN - uncontrolled, avoid triptans, mgmt per pcp and cardiology  - asthma - avoid bb, mgmt per pcp  - anxiety - consider anti-depressants for dual purpose ha's, anxiety, and sleep, if verapamil fails, mgmt per pcp  - risks, benefits, and potential side effects of verapamil, ubrelvy, advil, tylenol, medrol dose pack discussed   - alternative treatment options offered   - importance of healthy diet, regular exercise and sleep hygiene in the treatment of headaches    - Start tracking headaches via Migraine Maxwell jarret on phone   - RTC after imaging     Treatments Tried:  Propranolol - avoid 2/2 asthma hx  Zoloft   Metoprolol - brain fog  Advil  Tylenol   Triptans - avoid 2/2 uncontrolled bp's  Ubrelvy 50mg prn    Current Medications:    Current Outpatient Medications:     albuterol (PROVENTIL/VENTOLIN HFA) 90 mcg/actuation inhaler, Inhale into the lungs., Disp: , Rfl:     metoprolol tartrate (LOPRESSOR) 25 MG tablet, Take 25 mg by mouth once daily., Disp: , Rfl:     ubrogepant (UBRELVY) 50 mg tablet, Take 1 tablet by mouth at the onset of a headache. May repeat based on response and tolerability after more than 2 hours if needed. Do not take more than 200mg in a 24 hour span., Disp: 16 tablet, Rfl: 5    Review of Systems - as per HPI, otherwise a balanced 10 systems review is negative.    OBJECTIVE:  Vitals:  There were no vitals taken for this visit.     Physical Exam:  Constitutional: he appears well-developed and well-nourished. he is well  groomed. NAD   HENT:    Head: Normocephalic and atraumatic  Eyes: Conjunctivae and EOM are normal  Musculoskeletal: Normal range of motion. No joint stiffness.   Psychiatric: Mood and affect are normal    Neuro: Patient is alert and oriented to person, place, and time. Language is intact and fluent. Speech is clear and fluent. Recent and remote memory are intact.  Normal attention and concentration.  Facial movement is symmetric. Moves all 4 extremities against gravity.      Review of Data:   Notes from neuro reviewed   Labs:  Admission on 09/26/2024, Discharged on 09/26/2024   Component Date Value Ref Range Status    QRS Duration 09/26/2024 82  ms Final    [facility] QTC Calculation 09/26/2024 398  ms Final     Imaging:  Results for orders placed or performed during the hospital encounter of 09/23/24   MRI Brain W WO Contrast    Narrative    EXAMINATION:  MRI BRAIN W WO CONTRAST    CLINICAL HISTORY:  Headache, chronic, new features or increased frequency; Headache, unspecified    TECHNIQUE:  Multiplanar multisequence MR imaging of the brain was performed before and after the administration of 10 mL Gadavist intravenous contrast.    COMPARISON:  None    FINDINGS:  Ventricles are normal in size for age.  No hydrocephalus.    No evidence of recent or remote major vascular distribution infarct. No recent or remote hemorrhage.  No significant intracranial mass effect or midline shift.    Small left cerebellar developmental venous anomaly. No abnormal parenchymal or leptomeningeal enhancement.    No extra-axial blood or fluid collections.    The T2 skull base flow voids are preserved.    Bone marrow signal intensity unremarkable.      Impression    Small left cerebellar developmental venous anomaly.    Otherwise unremarkable examination as above    Electronically signed by resident: Bhavik Johnson  Date:    09/23/2024  Time:    08:26    Electronically signed by: Bhavik García MD  Date:    09/23/2024  Time:    10:40     Note: I  have independently reviewed any/all imaging/labs/tests and agree with the report (s) as documented.  Any discrepancies will be as noted/demarcated by free text.  JOI CLARKE 10/21/2024    ASSESSMENT:  1. Chronic migraine without aura without status migrainosus, not intractable        PLAN:  - Discussed symptoms appear to be consistent with migraines and rebound ha's, discussed treatment options and patient agreed with the following plan:  - ppx- consider verapamil for dual purposes ha's and bp, send to mailing pharmacy if started  - abortive - try ubrelvy 50mg prn  - rebound ha - remain off advil and tylenol  - Mri brain results listed above  - continue to d/c vaping  - obtain annual eye exam  - HTN - uncontrolled, avoid triptans, mgmt per pcp and cardiology  - asthma - avoid bb, mgmt per pcp  - anxiety - started zoloft recently, mgmt per pcp and psychiatrist  - track headaches   - Discussed goals of therapy are to decrease the frequency, intensity, and duration of headaches  - RTC in 2-3 mo          Questions and concerns were sought and answered to the patient's stated verbal satisfaction.  The patient verbalizes understanding and agreement with the above stated treatment plan.     CC: Maria Eugenia Umaña MD Sarena Patel, PA-C  [facility]   [phone]    Dr. Yen was available during today's encounter.     Visit today included increased complexity associated with the care of the episodic problem migraines addressed and managing the longitudinal care of the patient due to the serious and/or complex managed problem(s) migraines.

## 2024-10-21 NOTE — Clinical Note
3 mo f/up pls Mustarde Flap Text: The defect edges were debeveled with a #15 scalpel blade.  Given the size, depth and location of the defect and the proximity to free margins a Mustarde flap was deemed most appropriate.  Using a sterile surgical marker, an appropriate flap was drawn incorporating the defect. The area thus outlined was incised with a #15 scalpel blade.  The skin margins were undermined to an appropriate distance in all directions utilizing iris scissors.

## 2025-01-16 NOTE — PROGRESS NOTES
Established Patient     The patient location is: [place]  The chief complaint leading to consultation is: headache follow up visit    Visit type: audiovisual    Face to Face time with patient: 5 min  7 minutes of total time spent on the encounter, which includes face to face time and non-face to face time preparing to see the patient (eg, review of tests), Obtaining and/or reviewing separately obtained history, Documenting clinical information in the electronic or other health record, Independently interpreting results (not separately reported) and communicating results to the patient/family/caregiver, or Care coordination (not separately reported).     Each patient to whom he or she provides medical services by telemedicine is:  (1) informed of the relationship between the physician and patient and the respective role of any other health care provider with respect to management of the patient; and (2) notified that he or she may decline to receive medical services by telemedicine and may withdraw from such care at any time.    Notes:        SUBJECTIVE:  Patient ID: Jerry Dominique   Chief Complaint: Headache    History of Present Illness:  Jerry Dominique is a 24 y.o. male with ha's, asthma, anxiety, HTN who presents via virtual visit alone for follow-up of headaches.       01/17/2025 - Interval History:  Ha's have continued to improve. He has started sertraline and amlodipine/benazepril for bp and anxiety, feels they have also helped his ha's.   Plan: continue ubrelvy 50mg prn, track ha's, continue mgmt of bp and anxiety w/ pcp and psych, rtc 12 mo    10/21/2024 - Interval History:  Reviewed imaging results and answered pt's questions to pt's verbal satisfaction.   Pt reports ha's have improved greatly since last visit and are now occurring 3 days a week. Did not start verapamil or steroid taper as he had a panic attack at [facility] and has not returned since. He has begun zoloft for this and pending appt w/ psychiatrist.  Regarding his ha's, he did try ubrelvy 50mg prn twice but is unsure if it is helping. He also stopped taking otc's and vaping.   Plan: take ubrelvy 50mg prn, track ha's, continue anxiety mgmt per psych, consider verapamil if ha frequency remains elevated and send to mailing pharmacy, rtc 2-3 mo    Recommendations made at last Office Visit on 9/10/24:  - Discussed symptoms appear to be consistent with migraines and rebound ha's, discussed treatment options and patient agreed with the following plan:  - ppx- start verapamil for dual purposes ha's and bp  - abortive - try ubrelvy 50mg prn  - rebound ha - d/c advil and tylenol, steroid taper to bridge  - obtain Mri brain to r/o secondary causes for changes to ha's  - d/c vaping  - obtain annual eye exam  - HTN - uncontrolled, avoid triptans, mgmt per pcp and cardiology  - asthma - avoid bb, mgmt per pcp  - anxiety - consider anti-depressants for dual purpose ha's, anxiety, and sleep, if verapamil fails, mgmt per pcp  - risks, benefits, and potential side effects of verapamil, ubrelvy, advil, tylenol, medrol dose pack discussed   - alternative treatment options offered   - importance of healthy diet, regular exercise and sleep hygiene in the treatment of headaches    - Start tracking headaches via Migraine Maxwell jarret on phone   - RTC after imaging     Treatments Tried:  Propranolol - avoid 2/2 asthma hx  Sertraline - helps ha's possibly  Amlodipine/benazapril - helps ha's possibly  Metoprolol - brain fog  Advil  Tylenol   Triptans - avoid 2/2 uncontrolled bp's  Ubrelvy 50mg prn - helps    Current Medications:    Current Outpatient Medications:     albuterol (PROVENTIL/VENTOLIN HFA) 90 mcg/actuation inhaler, Inhale into the lungs., Disp: , Rfl:     amlodipine-benazepril 5-10 mg (LOTREL) 5-10 mg per capsule, Take 1 capsule by mouth once daily., Disp: , Rfl:     metoprolol tartrate (LOPRESSOR) 25 MG tablet, Take 25 mg by mouth once daily., Disp: , Rfl:     sertraline  (ZOLOFT) 25 MG tablet, Take 75 mg by mouth once daily., Disp: , Rfl:     ubrogepant (UBRELVY) 50 mg tablet, Take 1 tablet by mouth at the onset of a headache. May repeat based on response and tolerability after more than 2 hours if needed. Do not take more than 200mg in a 24 hour span., Disp: 16 tablet, Rfl: 11    Review of Systems - as per HPI, otherwise a balanced 10 systems review is negative.    OBJECTIVE:  Vitals:  There were no vitals taken for this visit.     Physical Exam:  Constitutional: he appears well-developed and well-nourished. he is well groomed. NAD   HENT:    Head: Normocephalic and atraumatic  Eyes: Conjunctivae and EOM are normal  Musculoskeletal: Normal range of motion. No joint stiffness.   Psychiatric: Mood and affect are normal    Neuro: Patient is alert and oriented to person, place, and time. Language is intact and fluent. Speech is clear and fluent. Recent and remote memory are intact.  Normal attention and concentration.  Facial movement is symmetric. Moves all 4 extremities against gravity.      Review of Data:   Notes from neuro reviewed   Labs:  No visits with results within 3 Month(s) from this visit.   Latest known visit with results is:   Admission on 09/26/2024, Discharged on 09/26/2024   Component Date Value Ref Range Status    QRS Duration 09/26/2024 82  ms Final    [facility] QTC Calculation 09/26/2024 398  ms Final     Imaging:  Results for orders placed or performed during the hospital encounter of 09/23/24   MRI Brain W WO Contrast    Narrative    EXAMINATION:  MRI BRAIN W WO CONTRAST    CLINICAL HISTORY:  Headache, chronic, new features or increased frequency; Headache, unspecified    TECHNIQUE:  Multiplanar multisequence MR imaging of the brain was performed before and after the administration of 10 mL Gadavist intravenous contrast.    COMPARISON:  None    FINDINGS:  Ventricles are normal in size for age.  No hydrocephalus.    No evidence of recent or remote major vascular  distribution infarct. No recent or remote hemorrhage.  No significant intracranial mass effect or midline shift.    Small left cerebellar developmental venous anomaly. No abnormal parenchymal or leptomeningeal enhancement.    No extra-axial blood or fluid collections.    The T2 skull base flow voids are preserved.    Bone marrow signal intensity unremarkable.      Impression    Small left cerebellar developmental venous anomaly.    Otherwise unremarkable examination as above    Electronically signed by resident: Bhavik Johnson  Date:    09/23/2024  Time:    08:26    Electronically signed by: Bhavik García MD  Date:    09/23/2024  Time:    10:40     Note: I have independently reviewed any/all imaging/labs/tests and agree with the report (s) as documented.  Any discrepancies will be as noted/demarcated by free text.  JOI CLARKE 1/17/2025    ASSESSMENT:  1. Migraine without aura and without status migrainosus, not intractable          PLAN:  - Discussed symptoms appear to be consistent with migraines , discussed treatment options and patient agreed with the following plan:  - abortive - ubrelvy 50mg prn  - rebound ha - remain off advil and tylenol  - Mri brain results listed above  - continue to d/c vaping  - HTN - uncontrolled, avoid triptans, mgmt per pcp and cardiology  - asthma - avoid bb, mgmt per pcp  - anxiety - started sertraline recently, mgmt per pcp and psychiatrist  - track headaches   - Discussed goals of therapy are to decrease the frequency, intensity, and duration of headaches  - RTC in 12 mo     Orders Placed This Encounter    ubrogepant (UBRELVY) 50 mg tablet       Questions and concerns were sought and answered to the patient's stated verbal satisfaction.  The patient verbalizes understanding and agreement with the above stated treatment plan.     CC: Maria Eugenia Umaña MD Sarena Patel, PA-C  [facility]   [phone]    Dr. Yen was available during today's encounter.     Visit  today included increased complexity associated with the care of the episodic problem migraines addressed and managing the longitudinal care of the patient due to the serious and/or complex managed problem(s) migraines.

## 2025-01-17 ENCOUNTER — OFFICE VISIT (OUTPATIENT)
Facility: CLINIC | Age: 25
End: 2025-01-17
Payer: COMMERCIAL

## 2025-01-17 DIAGNOSIS — G43.009 MIGRAINE WITHOUT AURA AND WITHOUT STATUS MIGRAINOSUS, NOT INTRACTABLE: ICD-10-CM

## 2025-01-17 RX ORDER — AMLODIPINE AND BENAZEPRIL HYDROCHLORIDE 5; 10 MG/1; MG/1
1 CAPSULE ORAL DAILY
COMMUNITY

## 2025-01-17 RX ORDER — UBROGEPANT 50 MG/1
TABLET ORAL
Qty: 16 TABLET | Refills: 11 | Status: SHIPPED | OUTPATIENT
Start: 2025-01-17 | End: 2026-01-17

## 2025-01-17 RX ORDER — SERTRALINE HYDROCHLORIDE 25 MG/1
75 TABLET, FILM COATED ORAL DAILY
COMMUNITY

## 2025-04-30 NOTE — PROGRESS NOTES
Gastroenterology Clinic Consultation Note    Reason for Visit:  The encounter diagnosis was Bright red blood per rectum.    PCP:   Maria Eugenia Umaña   No address on file      Initial HPI   This is a 24 y.o. male presenting for bright red blood in his stool. States the blood would be at the tip or on the side of his stool and on the tissue when he wipes the first one or two times. The first time it happened there was a lot of blood on the tissue when he wiped then after the second wipe, there was no blood at all. He does not notice any blood in the toilet. He denies any straining or constipation. His stools are normal caliber and consistency. States last week this happened 3 or 4 times and again on yesterday. He does not have any pain when he has a bowel movement.  He is unsure if he has any hemorrhoids but he does have some slight discomfort in his rectum after he has a bowel movement. He states that this happened all of a sudden but he thinks it could be from working out too hard. Denies any NSAID use, he may take ibuprofen once monthly for a headache. He has had reflux before that he notices after he works out his abdomen. He has every now and then abdominal pain. Denies any unintentional weight loss, iron deficiency anemia, fever, chills, nausea or vomiting.       ROS:  Review of Systems   Constitutional:  Negative for chills and fever.   Respiratory:  Negative for cough and shortness of breath.    Cardiovascular:  Negative for chest pain.   Gastrointestinal:  Positive for blood in stool. Negative for abdominal pain, constipation, diarrhea, heartburn, melena, nausea and vomiting.   Skin:  Negative for rash.   Neurological:  Negative for seizures, loss of consciousness and weakness.        Medical History:  has a past medical history of Asthma.    Surgical History:  has a past surgical history that includes arm surgery (Right).    Family  "History: family history is not on file..       Review of patient's allergies indicates:   Allergen Reactions    House dust Shortness Of Breath    Cat dander Nausea And Vomiting       Medications Ordered Prior to Encounter[1]      Objective Findings:    Vital Signs:  BP (!) 140/87 (BP Location: Left arm, Patient Position: Sitting)   Pulse 73   Ht 6' 1" (1.854 m)   Wt 110.3 kg (243 lb 2.7 oz)   BMI 32.08 kg/m²   Body mass index is 32.08 kg/m².    Physical Exam:  Physical Exam  Vitals and nursing note reviewed.   Constitutional:       General: He is not in acute distress.     Appearance: Normal appearance. He is not ill-appearing.   HENT:      Head: Normocephalic and atraumatic.   Eyes:      Extraocular Movements: Extraocular movements intact.   Cardiovascular:      Rate and Rhythm: Normal rate and regular rhythm.   Pulmonary:      Effort: Pulmonary effort is normal. No respiratory distress.   Abdominal:      General: Abdomen is flat. Bowel sounds are normal. There is no distension.      Palpations: Abdomen is soft.      Tenderness: There is no abdominal tenderness.   Genitourinary:     Comments: No abnormalities noted on outside of rectum.  Chaperone present.  Neurological:      General: No focal deficit present.      Mental Status: He is alert and oriented to person, place, and time.   Psychiatric:         Mood and Affect: Mood normal.         Behavior: Behavior normal.             Labs:  Lab Results   Component Value Date    WBC 10.00 11/18/2023    HGB 16.5 11/18/2023    HCT 48 11/18/2023     11/18/2023    ALKPHOS 94 11/18/2023    LIPASE 27 11/18/2023    ALT 96 (H) 11/18/2023    AST 49 (H) 11/18/2023     11/18/2023    K 3.4 (L) 11/18/2023     11/18/2023    CREATININE 1.1 11/18/2023    BUN 13 11/18/2023    CO2 20 (L) 11/18/2023    TSH 0.987 11/18/2023       Imaging reviewed: US Abdomen 09/20/2023  FINDINGS:    No focal mass, fluid collection, or hernia identified in the midline abdomen in the " area of concern.  Exam End: 09/20/23 08:40    Specimen Collected: 09/20/23 10:41 Last Resulted: 09/20/23 10:42       Endoscopy reviewed: No previous endoscopies.       Assessment:  1. Bright red blood per rectum      Orders Placed This Encounter    CBC W/ AUTO DIFFERENTIAL    Ambulatory referral/consult to Colorectal Surgery         Plan:  Last completed by PCP did not contain CBC. Will get a CBC to check blood counts.   Referral to CRS evaluate. If CRS does not find a reason for blood in stool will place referral for colonoscopy.     RTC as needed.       Thank you for allowing me to participate in this patient's care.    Sincerely,    ANA CORNELL, DEBBIEP-C  Gastroenterology Department  [facility]  [phone]           [1]   Current Outpatient Medications on File Prior to Visit   Medication Sig Dispense Refill    albuterol (PROVENTIL/VENTOLIN HFA) 90 mcg/actuation inhaler 2 puffs Inhalation every 4 hrs for 30 days      amlodipine-benazepril 5-10 mg (LOTREL) 5-10 mg per capsule Take 1 capsule by mouth once daily.      LORazepam (ATIVAN) 0.5 MG tablet Take 0.5 mg by mouth every 8 (eight) hours as needed.      sertraline (ZOLOFT) 25 MG tablet Take 75 mg by mouth once daily.      ubrogepant (UBRELVY) 50 mg tablet Take 1 tablet by mouth at the onset of a headache. May repeat based on response and tolerability after more than 2 hours if needed. Do not take more than 200mg in a 24 hour span. 16 tablet 11    albuterol (PROVENTIL/VENTOLIN HFA) 90 mcg/actuation inhaler Inhale into the lungs.      metoprolol tartrate (LOPRESSOR) 25 MG tablet Take 25 mg by mouth once daily. (Patient not taking: Reported on 5/1/2025)       No current facility-administered medications on file prior to visit.

## 2025-05-01 ENCOUNTER — LAB VISIT (OUTPATIENT)
Dept: LAB | Facility: HOSPITAL | Age: 25
End: 2025-05-01
Payer: COMMERCIAL

## 2025-05-01 ENCOUNTER — OFFICE VISIT (OUTPATIENT)
Dept: GASTROENTEROLOGY | Facility: CLINIC | Age: 25
End: 2025-05-01
Payer: COMMERCIAL

## 2025-05-01 VITALS
BODY MASS INDEX: 32.23 KG/M2 | HEIGHT: 73 IN | DIASTOLIC BLOOD PRESSURE: 87 MMHG | SYSTOLIC BLOOD PRESSURE: 140 MMHG | HEART RATE: 73 BPM | WEIGHT: 243.19 LBS

## 2025-05-01 DIAGNOSIS — K62.5 BRIGHT RED BLOOD PER RECTUM: ICD-10-CM

## 2025-05-01 DIAGNOSIS — K62.5 BRIGHT RED BLOOD PER RECTUM: Primary | ICD-10-CM

## 2025-05-01 LAB
ABSOLUTE EOSINOPHIL (OHS): 0.47 K/UL
ABSOLUTE MONOCYTE (OHS): 0.75 K/UL (ref 0.3–1)
ABSOLUTE NEUTROPHIL COUNT (OHS): 7.13 K/UL (ref 1.8–7.7)
BASOPHILS # BLD AUTO: 0.09 K/UL
BASOPHILS NFR BLD AUTO: 0.9 %
ERYTHROCYTE [DISTWIDTH] IN BLOOD BY AUTOMATED COUNT: 12.6 % (ref 11.5–14.5)
HCT VFR BLD AUTO: 46.5 % (ref 40–54)
HGB BLD-MCNC: 14.8 GM/DL (ref 14–18)
IMM GRANULOCYTES # BLD AUTO: 0.1 K/UL (ref 0–0.04)
IMM GRANULOCYTES NFR BLD AUTO: 1 % (ref 0–0.5)
LYMPHOCYTES # BLD AUTO: 1.89 K/UL (ref 1–4.8)
MCH RBC QN AUTO: 26.8 PG (ref 27–31)
MCHC RBC AUTO-ENTMCNC: 31.8 G/DL (ref 32–36)
MCV RBC AUTO: 84 FL (ref 82–98)
NUCLEATED RBC (/100WBC) (OHS): 0 /100 WBC
PLATELET # BLD AUTO: 332 K/UL (ref 150–450)
PMV BLD AUTO: 9.4 FL (ref 9.2–12.9)
RBC # BLD AUTO: 5.53 M/UL (ref 4.6–6.2)
RELATIVE EOSINOPHIL (OHS): 4.5 %
RELATIVE LYMPHOCYTE (OHS): 18.1 % (ref 18–48)
RELATIVE MONOCYTE (OHS): 7.2 % (ref 4–15)
RELATIVE NEUTROPHIL (OHS): 68.3 % (ref 38–73)
WBC # BLD AUTO: 10.43 K/UL (ref 3.9–12.7)

## 2025-05-01 PROCEDURE — 3077F SYST BP >= 140 MM HG: CPT | Mod: CPTII,S$GLB,,

## 2025-05-01 PROCEDURE — 99203 OFFICE O/P NEW LOW 30 MIN: CPT | Mod: S$GLB,,,

## 2025-05-01 PROCEDURE — 3079F DIAST BP 80-89 MM HG: CPT | Mod: CPTII,S$GLB,,

## 2025-05-01 PROCEDURE — 99999 PR PBB SHADOW E&M-EST. PATIENT-LVL IV: CPT | Mod: PBBFAC,,,

## 2025-05-01 PROCEDURE — 36415 COLL VENOUS BLD VENIPUNCTURE: CPT

## 2025-05-01 PROCEDURE — 4010F ACE/ARB THERAPY RXD/TAKEN: CPT | Mod: CPTII,S$GLB,,

## 2025-05-01 PROCEDURE — 3008F BODY MASS INDEX DOCD: CPT | Mod: CPTII,S$GLB,,

## 2025-05-01 PROCEDURE — 1160F RVW MEDS BY RX/DR IN RCRD: CPT | Mod: CPTII,S$GLB,,

## 2025-05-01 PROCEDURE — 1159F MED LIST DOCD IN RCRD: CPT | Mod: CPTII,S$GLB,,

## 2025-05-01 PROCEDURE — 85025 COMPLETE CBC W/AUTO DIFF WBC: CPT

## 2025-05-01 RX ORDER — ALBUTEROL SULFATE 90 UG/1
INHALANT RESPIRATORY (INHALATION)
COMMUNITY
Start: 2025-03-19

## 2025-05-01 RX ORDER — LORAZEPAM 0.5 MG/1
0.5 TABLET ORAL EVERY 8 HOURS PRN
COMMUNITY

## 2025-05-09 ENCOUNTER — TELEPHONE (OUTPATIENT)
Dept: SURGERY | Facility: CLINIC | Age: 25
End: 2025-05-09
Payer: COMMERCIAL

## 2025-05-13 ENCOUNTER — OFFICE VISIT (OUTPATIENT)
Dept: SURGERY | Facility: CLINIC | Age: 25
End: 2025-05-13
Payer: COMMERCIAL

## 2025-05-13 VITALS
BODY MASS INDEX: 32.61 KG/M2 | DIASTOLIC BLOOD PRESSURE: 86 MMHG | SYSTOLIC BLOOD PRESSURE: 137 MMHG | RESPIRATION RATE: 18 BRPM | HEART RATE: 79 BPM | WEIGHT: 246.06 LBS | HEIGHT: 73 IN

## 2025-05-13 DIAGNOSIS — K62.5 BRIGHT RED BLOOD PER RECTUM: ICD-10-CM

## 2025-05-13 DIAGNOSIS — K64.0 GRADE I HEMORRHOIDS: Primary | ICD-10-CM

## 2025-05-13 PROCEDURE — 1159F MED LIST DOCD IN RCRD: CPT | Mod: CPTII,S$GLB,, | Performed by: NURSE PRACTITIONER

## 2025-05-13 PROCEDURE — 3008F BODY MASS INDEX DOCD: CPT | Mod: CPTII,S$GLB,, | Performed by: NURSE PRACTITIONER

## 2025-05-13 PROCEDURE — 3079F DIAST BP 80-89 MM HG: CPT | Mod: CPTII,S$GLB,, | Performed by: NURSE PRACTITIONER

## 2025-05-13 PROCEDURE — 46600 DIAGNOSTIC ANOSCOPY SPX: CPT | Mod: S$GLB,,, | Performed by: NURSE PRACTITIONER

## 2025-05-13 PROCEDURE — 99204 OFFICE O/P NEW MOD 45 MIN: CPT | Mod: 25,S$GLB,, | Performed by: NURSE PRACTITIONER

## 2025-05-13 PROCEDURE — 4010F ACE/ARB THERAPY RXD/TAKEN: CPT | Mod: CPTII,S$GLB,, | Performed by: NURSE PRACTITIONER

## 2025-05-13 PROCEDURE — 99999 PR PBB SHADOW E&M-EST. PATIENT-LVL IV: CPT | Mod: PBBFAC,,, | Performed by: NURSE PRACTITIONER

## 2025-05-13 PROCEDURE — 1160F RVW MEDS BY RX/DR IN RCRD: CPT | Mod: CPTII,S$GLB,, | Performed by: NURSE PRACTITIONER

## 2025-05-13 PROCEDURE — 3075F SYST BP GE 130 - 139MM HG: CPT | Mod: CPTII,S$GLB,, | Performed by: NURSE PRACTITIONER

## 2025-05-13 RX ORDER — HYDROCORTISONE 25 MG/G
CREAM TOPICAL 2 TIMES DAILY
Qty: 28 G | Refills: 1 | Status: SHIPPED | OUTPATIENT
Start: 2025-05-13

## 2025-05-13 NOTE — PROGRESS NOTES
"CRS Office Visit History and Physical    Referring Md:   Lisandro Mendoza, Fnp-c  [address] [place] [address]    SUBJECTIVE:     Chief Complaint: rectal bleeding    History of Present Illness:  The patient is a new patient to this practice.   Course is as follows:  Patient is a 24 y.o. male presents with rectal bleeding. Referred from GI. Hgb WNL.  He had a several day, or up to a week,  hx of rectal bleeding on stool and with wiping a couple of weeks ago. He hasn't noticed any bleeding in about 10 days.   Every now and then he has some abd pain around abdomen. Present a couple months. Unrelated to BMs, lasts about couple minutes.   Has a BM almost daily, soft and easy to pass. Rare bouts of diarrhea  Does do heavy weight lifting    Last Colonoscopy: np  Family history of colorectal cancer or IBD: not that he is aware of. .    Review of patient's allergies indicates:   Allergen Reactions    House dust Shortness Of Breath    Cat dander Nausea And Vomiting       Past Medical History:   Diagnosis Date    Asthma      Past Surgical History:   Procedure Laterality Date    arm surgery Right     " Pins"     No family history on file.  Social History[1]     Review of Systems:  Review of Systems   Constitutional:  Negative for chills, fever and weight loss.       OBJECTIVE:     Vital Signs (Most Recent)  /86 (BP Location: Left arm, Patient Position: Sitting)   Pulse 79   Resp 18   Ht 6' 1" (1.854 m)   Wt 111.6 kg (246 lb 0.5 oz)   BMI 32.46 kg/m²     Physical Exam:  General: White male in no distress   Neuro: Alert and oriented to person, place, and time.  Moves all extremities.     HEENT: No icterus.  Trachea midline  Respiratory: Respirations are even and unlabored, no cough or audible wheezing  Skin: Warm dry and intact, No visible rashes, no jaundice    Labs reviewed today:  Lab Results   Component Value Date    WBC 10.43 05/01/2025    HGB 14.8 05/01/2025    HCT 46.5 05/01/2025     " 05/01/2025    ALT 96 (H) 11/18/2023    AST 49 (H) 11/18/2023     11/18/2023    K 3.4 (L) 11/18/2023     11/18/2023    CREATININE 1.1 11/18/2023    BUN 13 11/18/2023    CO2 20 (L) 11/18/2023    TSH 0.987 11/18/2023       Anorectal Exam:    Anal Skin: Normal    Digital Rectal Exam:  Resting Tone normal  Mass none  Tenderness  absent    Anoscopy:  Verbal consent was obtained.   Clear plastic anoscope inserted.    Hemorrhoids  present w capillary changes  Stigmata of bleeding  absent  Stigmata of prolapsed none  Distal rectal mucosa normal        ASSESSMENT/PLAN:     Diagnoses and all orders for this visit:    Grade I hemorrhoids    Bright red blood per rectum  -     Ambulatory referral/consult to Colorectal Surgery  -     hydrocortisone (ANUSOL-HC) 2.5 % rectal cream; Place rectally 2 (two) times daily.    25 yo M here with almost a week hx of rectal bleeding. Hemorrhoids on exam. Could have been from these, will treat a such.     The patient was instructed to:  Start fiber supplement such as Fibercon (capsule) Citrucel or Metamucil every day, increase as tolerated per  instructions to achieve atleast three well formed and easy to pass stools per 7 day period  Water intake of 64 oz per day  May use prescribed cortisone cream to hemorrhoids twice a day not to exceed more than 10 days at a time. Take 10 day medication vacation holiday then resume as needed.  Warm sitz baths as needed  Do not sit on the toilet for more than 5 mins at a time  No straining  F/u PRN    DOMINGO Franklin  Colon and Rectal Surgery           [1]   Social History  Tobacco Use    Smoking status: Former     Types: Vaping with nicotine    Smokeless tobacco: Never    Tobacco comments:     Vape   Substance Use Topics    Alcohol use: No    Drug use: No

## 2025-05-13 NOTE — PATIENT INSTRUCTIONS
Start fiber supplement such as Fibercon (capsule) Citrucel or Metamucil every day, increase as tolerated per  instructions to achieve atleast three well formed and easy to pass stools per 7 day period  Water intake of 64 oz per day  May use prescribed cortisone cream to hemorrhoids twice a day not to exceed more than 10 days at a time. Take10 day medication vacation holiday then resume as needed.  Warm sitz baths as needed  Do not sit on the toilet for more than 5 mins at a time  No straining